# Patient Record
Sex: MALE | Race: WHITE | NOT HISPANIC OR LATINO | Employment: OTHER | URBAN - METROPOLITAN AREA
[De-identification: names, ages, dates, MRNs, and addresses within clinical notes are randomized per-mention and may not be internally consistent; named-entity substitution may affect disease eponyms.]

---

## 2018-02-27 ENCOUNTER — OFFICE VISIT (OUTPATIENT)
Dept: OPHTHALMOLOGY | Facility: CLINIC | Age: 63
End: 2018-02-27
Payer: COMMERCIAL

## 2018-02-27 DIAGNOSIS — H11.153 PINGUECULA OF BOTH EYES: Primary | ICD-10-CM

## 2018-02-27 DIAGNOSIS — H02.831 DERMATOCHALASIS OF BOTH UPPER EYELIDS: ICD-10-CM

## 2018-02-27 DIAGNOSIS — H02.834 DERMATOCHALASIS OF BOTH UPPER EYELIDS: ICD-10-CM

## 2018-02-27 DIAGNOSIS — H52.7 REFRACTIVE ERROR: ICD-10-CM

## 2018-02-27 PROCEDURE — 99999 PR PBB SHADOW E&M-EST. PATIENT-LVL II: CPT | Mod: PBBFAC,,, | Performed by: OPHTHALMOLOGY

## 2018-02-27 PROCEDURE — 92004 COMPRE OPH EXAM NEW PT 1/>: CPT | Mod: S$GLB,,, | Performed by: OPHTHALMOLOGY

## 2018-02-27 RX ORDER — CLORAZEPATE DIPOTASSIUM 3.75 MG/1
TABLET ORAL
COMMUNITY
Start: 2014-11-26 | End: 2021-09-10 | Stop reason: SDUPTHER

## 2018-02-27 RX ORDER — SILDENAFIL 100 MG/1
100 TABLET, FILM COATED ORAL DAILY PRN
COMMUNITY

## 2018-02-27 RX ORDER — LOSARTAN POTASSIUM 50 MG/1
50 TABLET ORAL
COMMUNITY
Start: 2014-04-14 | End: 2021-09-10 | Stop reason: SDUPTHER

## 2018-02-27 RX ORDER — AMLODIPINE BESYLATE 10 MG/1
10 TABLET ORAL
COMMUNITY
Start: 2014-04-14 | End: 2021-09-10 | Stop reason: SDUPTHER

## 2018-02-27 RX ORDER — DOCUSATE CALCIUM 240 MG
200 CAPSULE ORAL
COMMUNITY

## 2018-02-27 RX ORDER — PANTOPRAZOLE SODIUM 40 MG/1
40 TABLET, DELAYED RELEASE ORAL DAILY
COMMUNITY
End: 2021-09-10 | Stop reason: SDUPTHER

## 2018-02-27 RX ORDER — NAPROXEN 500 MG/1
500 TABLET ORAL
COMMUNITY
Start: 2014-09-26 | End: 2021-09-10 | Stop reason: SDUPTHER

## 2018-02-27 NOTE — PROGRESS NOTES
SUBJECTIVE:   Tom Shaw is a 62 y.o. male   Uncorrected distance visual acuity was 20/50 in the right eye and 20/40 -2 in the left eye.   Chief Complaint   Patient presents with    Blurred Vision     pt states time for updated exam havent had one in years no serious complaints        HPI:  HPI     Blurred Vision    Additional comments: pt states time for updated exam havent had one in   years no serious complaints       Last edited by Tg Sterling MA on 2/27/2018  8:58 AM. (History)        Assessment /Plan :  1. Pinguecula of both eyes monitor for now   2. Dermatochalasis of both upper eyelids monitor for now   3. Refractive error recommend +1.00 OTC glasses for distance and +2.75 OTC glasses for near     RTC in 1 year or prn any changes

## 2019-08-27 ENCOUNTER — OFFICE VISIT (OUTPATIENT)
Dept: OPHTHALMOLOGY | Facility: CLINIC | Age: 64
End: 2019-08-27
Payer: COMMERCIAL

## 2019-08-27 DIAGNOSIS — H52.7 REFRACTIVE ERROR: ICD-10-CM

## 2019-08-27 DIAGNOSIS — H10.13 ALLERGIC CONJUNCTIVITIS OF BOTH EYES: Primary | ICD-10-CM

## 2019-08-27 PROCEDURE — 92014 PR EYE EXAM, EST PATIENT,COMPREHESV: ICD-10-PCS | Mod: S$GLB,,, | Performed by: OPHTHALMOLOGY

## 2019-08-27 PROCEDURE — 92015 PR REFRACTION: ICD-10-PCS | Mod: S$GLB,,, | Performed by: OPHTHALMOLOGY

## 2019-08-27 PROCEDURE — 99999 PR PBB SHADOW E&M-EST. PATIENT-LVL II: CPT | Mod: PBBFAC,,, | Performed by: OPHTHALMOLOGY

## 2019-08-27 PROCEDURE — 92015 DETERMINE REFRACTIVE STATE: CPT | Mod: S$GLB,,, | Performed by: OPHTHALMOLOGY

## 2019-08-27 PROCEDURE — 99212 OFFICE O/P EST SF 10 MIN: CPT | Mod: PBBFAC | Performed by: OPHTHALMOLOGY

## 2019-08-27 PROCEDURE — 92014 COMPRE OPH EXAM EST PT 1/>: CPT | Mod: S$GLB,,, | Performed by: OPHTHALMOLOGY

## 2019-08-27 PROCEDURE — 99999 PR PBB SHADOW E&M-EST. PATIENT-LVL II: ICD-10-PCS | Mod: PBBFAC,,, | Performed by: OPHTHALMOLOGY

## 2019-08-27 RX ORDER — GABAPENTIN 300 MG/1
CAPSULE ORAL
COMMUNITY
Start: 2019-06-25 | End: 2021-09-10

## 2019-08-27 RX ORDER — CYCLOBENZAPRINE HCL 10 MG
10 TABLET ORAL 3 TIMES DAILY PRN
COMMUNITY
End: 2021-09-10 | Stop reason: SDUPTHER

## 2019-08-27 NOTE — PROGRESS NOTES
SUBJECTIVE:   Tom Shaw is a 64 y.o. male   Corrected distance visual acuity was 20/25 in the right eye and 20/30 -2 in the left eye.   Chief Complaint   Patient presents with    Blurred Vision        HPI:  HPI     Pt states va is very blurred at a distance also having trouble with blood   shot red eyes when he wakes in the morning     Last edited by Tg Sterling MA on 8/27/2019  8:02 AM. (History)        Assessment /Plan :  1. Allergic conjunctivitis of both eyes recommend Lumify as directed   2. Refractive error PAL Rx     RTC in 1 year or prn any changes

## 2019-09-24 ENCOUNTER — OFFICE VISIT (OUTPATIENT)
Dept: OPHTHALMOLOGY | Facility: CLINIC | Age: 64
End: 2019-09-24
Payer: MEDICARE

## 2019-09-24 DIAGNOSIS — H52.7 REFRACTIVE ERROR: Primary | ICD-10-CM

## 2019-09-24 PROCEDURE — 99999 PR PBB SHADOW E&M-EST. PATIENT-LVL II: CPT | Mod: PBBFAC,,, | Performed by: OPHTHALMOLOGY

## 2019-09-24 PROCEDURE — 99499 NO LOS: ICD-10-PCS | Mod: S$PBB,,, | Performed by: OPHTHALMOLOGY

## 2019-09-24 PROCEDURE — 99999 PR PBB SHADOW E&M-EST. PATIENT-LVL II: ICD-10-PCS | Mod: PBBFAC,,, | Performed by: OPHTHALMOLOGY

## 2019-09-24 PROCEDURE — 99212 OFFICE O/P EST SF 10 MIN: CPT | Mod: PBBFAC | Performed by: OPHTHALMOLOGY

## 2019-09-24 PROCEDURE — 99499 UNLISTED E&M SERVICE: CPT | Mod: S$PBB,,, | Performed by: OPHTHALMOLOGY

## 2019-09-24 NOTE — PROGRESS NOTES
SUBJECTIVE:   Tom Shaw is a 64 y.o. male   Corrected distance visual acuity was 20/25 +2 in the right eye and 20/20 in the left eye.   Chief Complaint   Patient presents with    Glasses prescription     re-chk        HPI:  HPI     Glasses prescription      Additional comments: re-chk              Comments     Pt here due to error in glasses. Pt believes the reading isn't strong   enough and the distance might be too strong.    PCP:Dr.Murry Loja    1.HTN          Last edited by Michael Hernandez, Patient Care Assistant on 9/24/2019  7:41   AM. (History)        Assessment /Plan :  1. Refractive error   Rx made incorrectly new PAL rx given

## 2021-07-17 ENCOUNTER — TELEPHONE (OUTPATIENT)
Dept: INTERNAL MEDICINE | Facility: CLINIC | Age: 66
End: 2021-07-17

## 2021-09-09 ENCOUNTER — TELEPHONE (OUTPATIENT)
Dept: INTERNAL MEDICINE | Facility: CLINIC | Age: 66
End: 2021-09-09

## 2021-09-10 ENCOUNTER — LAB VISIT (OUTPATIENT)
Dept: LAB | Facility: HOSPITAL | Age: 66
End: 2021-09-10
Attending: INTERNAL MEDICINE
Payer: MEDICARE

## 2021-09-10 ENCOUNTER — OFFICE VISIT (OUTPATIENT)
Dept: PRIMARY CARE CLINIC | Facility: CLINIC | Age: 66
End: 2021-09-10
Attending: INTERNAL MEDICINE
Payer: MEDICARE

## 2021-09-10 ENCOUNTER — TELEPHONE (OUTPATIENT)
Dept: INTERNAL MEDICINE | Facility: CLINIC | Age: 66
End: 2021-09-10

## 2021-09-10 VITALS
BODY MASS INDEX: 28.87 KG/M2 | SYSTOLIC BLOOD PRESSURE: 142 MMHG | HEIGHT: 68 IN | HEART RATE: 88 BPM | OXYGEN SATURATION: 97 % | DIASTOLIC BLOOD PRESSURE: 88 MMHG | WEIGHT: 190.5 LBS

## 2021-09-10 DIAGNOSIS — F41.9 ANXIETY: ICD-10-CM

## 2021-09-10 DIAGNOSIS — J30.89 NON-SEASONAL ALLERGIC RHINITIS, UNSPECIFIED TRIGGER: ICD-10-CM

## 2021-09-10 DIAGNOSIS — E66.3 OVERWEIGHT (BMI 25.0-29.9): ICD-10-CM

## 2021-09-10 DIAGNOSIS — I10 HYPERTENSION, UNSPECIFIED TYPE: ICD-10-CM

## 2021-09-10 DIAGNOSIS — M16.0 PRIMARY OSTEOARTHRITIS OF BOTH HIPS: ICD-10-CM

## 2021-09-10 DIAGNOSIS — Z87.11 HISTORY OF PEPTIC ULCER DISEASE: ICD-10-CM

## 2021-09-10 DIAGNOSIS — N52.9 ERECTILE DYSFUNCTION, UNSPECIFIED ERECTILE DYSFUNCTION TYPE: ICD-10-CM

## 2021-09-10 DIAGNOSIS — F51.04 CHRONIC INSOMNIA: ICD-10-CM

## 2021-09-10 DIAGNOSIS — G25.2 INTENTION TREMOR: ICD-10-CM

## 2021-09-10 PROBLEM — J30.9 ALLERGIC RHINITIS: Status: ACTIVE | Noted: 2021-09-10

## 2021-09-10 PROBLEM — K21.9 GERD (GASTROESOPHAGEAL REFLUX DISEASE): Status: ACTIVE | Noted: 2021-09-10

## 2021-09-10 PROBLEM — M19.90 OSTEOARTHRITIS: Status: ACTIVE | Noted: 2021-09-10

## 2021-09-10 LAB
ANION GAP SERPL CALC-SCNC: 10 MMOL/L (ref 8–16)
BUN SERPL-MCNC: 18 MG/DL (ref 8–23)
CALCIUM SERPL-MCNC: 9.6 MG/DL (ref 8.7–10.5)
CHLORIDE SERPL-SCNC: 107 MMOL/L (ref 95–110)
CHOLEST SERPL-MCNC: 148 MG/DL (ref 120–199)
CHOLEST/HDLC SERPL: 3.4 {RATIO} (ref 2–5)
CO2 SERPL-SCNC: 26 MMOL/L (ref 23–29)
CREAT SERPL-MCNC: 0.9 MG/DL (ref 0.5–1.4)
EST. GFR  (AFRICAN AMERICAN): >60 ML/MIN/1.73 M^2
EST. GFR  (NON AFRICAN AMERICAN): >60 ML/MIN/1.73 M^2
GLUCOSE SERPL-MCNC: 117 MG/DL (ref 70–110)
HDLC SERPL-MCNC: 43 MG/DL (ref 40–75)
HDLC SERPL: 29.1 % (ref 20–50)
LDLC SERPL CALC-MCNC: 90.8 MG/DL (ref 63–159)
NONHDLC SERPL-MCNC: 105 MG/DL
POTASSIUM SERPL-SCNC: 4.3 MMOL/L (ref 3.5–5.1)
SODIUM SERPL-SCNC: 143 MMOL/L (ref 136–145)
TRIGL SERPL-MCNC: 71 MG/DL (ref 30–150)

## 2021-09-10 PROCEDURE — 99204 PR OFFICE/OUTPT VISIT, NEW, LEVL IV, 45-59 MIN: ICD-10-PCS | Mod: S$PBB,,, | Performed by: INTERNAL MEDICINE

## 2021-09-10 PROCEDURE — 99213 OFFICE O/P EST LOW 20 MIN: CPT | Mod: PBBFAC | Performed by: INTERNAL MEDICINE

## 2021-09-10 PROCEDURE — 99204 OFFICE O/P NEW MOD 45 MIN: CPT | Mod: S$PBB,,, | Performed by: INTERNAL MEDICINE

## 2021-09-10 PROCEDURE — 80048 BASIC METABOLIC PNL TOTAL CA: CPT | Performed by: INTERNAL MEDICINE

## 2021-09-10 PROCEDURE — 99999 PR PBB SHADOW E&M-EST. PATIENT-LVL III: CPT | Mod: PBBFAC,,, | Performed by: INTERNAL MEDICINE

## 2021-09-10 PROCEDURE — 80061 LIPID PANEL: CPT | Performed by: INTERNAL MEDICINE

## 2021-09-10 PROCEDURE — 99999 PR PBB SHADOW E&M-EST. PATIENT-LVL III: ICD-10-PCS | Mod: PBBFAC,,, | Performed by: INTERNAL MEDICINE

## 2021-09-10 PROCEDURE — 36415 COLL VENOUS BLD VENIPUNCTURE: CPT | Performed by: INTERNAL MEDICINE

## 2021-09-10 RX ORDER — PANTOPRAZOLE SODIUM 40 MG/1
40 TABLET, DELAYED RELEASE ORAL DAILY
Qty: 30 TABLET | Refills: 11 | Status: SHIPPED | OUTPATIENT
Start: 2021-09-10 | End: 2022-07-10

## 2021-09-10 RX ORDER — AMLODIPINE BESYLATE 10 MG/1
10 TABLET ORAL DAILY
Qty: 30 TABLET | Refills: 11 | Status: SHIPPED | OUTPATIENT
Start: 2021-09-10 | End: 2022-09-13 | Stop reason: SDUPTHER

## 2021-09-10 RX ORDER — CLORAZEPATE DIPOTASSIUM 3.75 MG/1
7.5 TABLET ORAL NIGHTLY PRN
Qty: 60 TABLET | Refills: 1 | Status: SHIPPED | OUTPATIENT
Start: 2021-09-10 | End: 2021-12-23 | Stop reason: SDUPTHER

## 2021-09-10 RX ORDER — LOSARTAN POTASSIUM 50 MG/1
50 TABLET ORAL DAILY
Qty: 30 TABLET | Refills: 11 | Status: SHIPPED | OUTPATIENT
Start: 2021-09-10 | End: 2022-02-25

## 2021-09-10 RX ORDER — NAPROXEN 500 MG/1
500 TABLET ORAL 2 TIMES DAILY WITH MEALS
Qty: 60 TABLET | Refills: 5 | Status: SHIPPED | OUTPATIENT
Start: 2021-09-10 | End: 2022-07-14

## 2021-09-10 RX ORDER — PROPRANOLOL HYDROCHLORIDE 10 MG/1
10 TABLET ORAL 3 TIMES DAILY
Qty: 90 TABLET | Refills: 1 | Status: SHIPPED | OUTPATIENT
Start: 2021-09-10 | End: 2021-10-04

## 2021-09-10 RX ORDER — CYCLOBENZAPRINE HCL 10 MG
10 TABLET ORAL NIGHTLY
Qty: 30 TABLET | Refills: 5 | Status: SHIPPED | OUTPATIENT
Start: 2021-09-10 | End: 2022-08-08

## 2022-02-24 ENCOUNTER — TELEPHONE (OUTPATIENT)
Dept: INTERNAL MEDICINE | Facility: CLINIC | Age: 67
End: 2022-02-24
Payer: COMMERCIAL

## 2022-02-25 ENCOUNTER — LAB VISIT (OUTPATIENT)
Dept: LAB | Facility: HOSPITAL | Age: 67
End: 2022-02-25
Attending: INTERNAL MEDICINE
Payer: MEDICARE

## 2022-02-25 ENCOUNTER — OFFICE VISIT (OUTPATIENT)
Dept: PRIMARY CARE CLINIC | Facility: CLINIC | Age: 67
End: 2022-02-25
Attending: INTERNAL MEDICINE
Payer: MEDICARE

## 2022-02-25 VITALS
HEIGHT: 68 IN | DIASTOLIC BLOOD PRESSURE: 94 MMHG | HEART RATE: 74 BPM | BODY MASS INDEX: 26.66 KG/M2 | OXYGEN SATURATION: 98 % | SYSTOLIC BLOOD PRESSURE: 178 MMHG | WEIGHT: 175.94 LBS

## 2022-02-25 DIAGNOSIS — E66.3 OVERWEIGHT (BMI 25.0-29.9): ICD-10-CM

## 2022-02-25 DIAGNOSIS — I10 HYPERTENSION, UNSPECIFIED TYPE: ICD-10-CM

## 2022-02-25 DIAGNOSIS — N52.9 ERECTILE DYSFUNCTION, UNSPECIFIED ERECTILE DYSFUNCTION TYPE: ICD-10-CM

## 2022-02-25 DIAGNOSIS — Z87.11 HISTORY OF PEPTIC ULCER DISEASE: ICD-10-CM

## 2022-02-25 DIAGNOSIS — F51.04 CHRONIC INSOMNIA: ICD-10-CM

## 2022-02-25 DIAGNOSIS — I10 HYPERTENSION, UNSPECIFIED TYPE: Primary | ICD-10-CM

## 2022-02-25 DIAGNOSIS — M16.0 PRIMARY OSTEOARTHRITIS OF BOTH HIPS: ICD-10-CM

## 2022-02-25 DIAGNOSIS — G25.2 INTENTION TREMOR: ICD-10-CM

## 2022-02-25 DIAGNOSIS — Z91.89 AT INCREASED RISK FOR CARDIOVASCULAR DISEASE: ICD-10-CM

## 2022-02-25 DIAGNOSIS — F41.9 ANXIETY: ICD-10-CM

## 2022-02-25 LAB
ANION GAP SERPL CALC-SCNC: 9 MMOL/L (ref 8–16)
BUN SERPL-MCNC: 13 MG/DL (ref 8–23)
CALCIUM SERPL-MCNC: 9.7 MG/DL (ref 8.7–10.5)
CHLORIDE SERPL-SCNC: 105 MMOL/L (ref 95–110)
CO2 SERPL-SCNC: 29 MMOL/L (ref 23–29)
CREAT SERPL-MCNC: 0.8 MG/DL (ref 0.5–1.4)
EST. GFR  (AFRICAN AMERICAN): >60 ML/MIN/1.73 M^2
EST. GFR  (NON AFRICAN AMERICAN): >60 ML/MIN/1.73 M^2
GLUCOSE SERPL-MCNC: 107 MG/DL (ref 70–110)
POTASSIUM SERPL-SCNC: 4.6 MMOL/L (ref 3.5–5.1)
SODIUM SERPL-SCNC: 143 MMOL/L (ref 136–145)

## 2022-02-25 PROCEDURE — 99214 OFFICE O/P EST MOD 30 MIN: CPT | Mod: S$PBB,,, | Performed by: INTERNAL MEDICINE

## 2022-02-25 PROCEDURE — 36415 COLL VENOUS BLD VENIPUNCTURE: CPT | Performed by: INTERNAL MEDICINE

## 2022-02-25 PROCEDURE — 99213 OFFICE O/P EST LOW 20 MIN: CPT | Mod: PBBFAC | Performed by: INTERNAL MEDICINE

## 2022-02-25 PROCEDURE — 99214 PR OFFICE/OUTPT VISIT, EST, LEVL IV, 30-39 MIN: ICD-10-PCS | Mod: S$PBB,,, | Performed by: INTERNAL MEDICINE

## 2022-02-25 PROCEDURE — 99999 PR PBB SHADOW E&M-EST. PATIENT-LVL III: ICD-10-PCS | Mod: PBBFAC,,, | Performed by: INTERNAL MEDICINE

## 2022-02-25 PROCEDURE — 99999 PR PBB SHADOW E&M-EST. PATIENT-LVL III: CPT | Mod: PBBFAC,,, | Performed by: INTERNAL MEDICINE

## 2022-02-25 PROCEDURE — 80048 BASIC METABOLIC PNL TOTAL CA: CPT | Performed by: INTERNAL MEDICINE

## 2022-02-25 RX ORDER — CLORAZEPATE DIPOTASSIUM 7.5 MG/1
7.5 TABLET ORAL DAILY
Qty: 30 TABLET | Refills: 5 | Status: SHIPPED | OUTPATIENT
Start: 2022-02-25 | End: 2022-09-06 | Stop reason: SDUPTHER

## 2022-02-25 RX ORDER — PROPRANOLOL HYDROCHLORIDE 60 MG/1
60 CAPSULE, EXTENDED RELEASE ORAL DAILY
Qty: 30 CAPSULE | Refills: 11 | Status: SHIPPED | OUTPATIENT
Start: 2022-02-25 | End: 2022-12-13 | Stop reason: SDUPTHER

## 2022-02-25 RX ORDER — OLMESARTAN MEDOXOMIL 40 MG/1
40 TABLET ORAL DAILY
Qty: 90 TABLET | Refills: 3 | Status: SHIPPED | OUTPATIENT
Start: 2022-02-25 | End: 2022-09-13 | Stop reason: SDUPTHER

## 2022-02-25 RX ORDER — ATORVASTATIN CALCIUM 10 MG/1
10 TABLET, FILM COATED ORAL DAILY
Qty: 90 TABLET | Refills: 3 | Status: SHIPPED | OUTPATIENT
Start: 2022-02-25 | End: 2022-11-16

## 2022-02-25 NOTE — ASSESSMENT & PLAN NOTE
"His fasting blood sugar 6 months ago was mildly elevated  --he stopped soft drinks and reduced his intake of sweets, along with other dietary modifications (switched to diet sprite and lemon water and unsweetened tea)  --he has lost about 15 lbs  He is also walking more regularly  He "snacks" in the evening but has been choosing more sugar free options  "

## 2022-02-25 NOTE — ASSESSMENT & PLAN NOTE
Taking propanolol 10 mg twice daily -- but only between 20 and 50% improvement  Will changed to Inderal LA 60 mg to see if that works better for him

## 2022-02-25 NOTE — PROGRESS NOTES
"Intensive Primary Care Provider Appointment  Shared Note: Helen Barreto & Blaise NOLASCO)    Subjective:      Patient ID: Tom Shaw is a 66 y.o. male.    Chief Complaint: Follow-up      HPI:  Here for follow-up.  Tremor is a little better with the propanolol.      BP elevated today, which he attributes to "white coat" hypertension.  But he admits to not recently checking his BP's as often as he used to.      No specific complaints.  Does have some chronic shoulder pain for the past 8 to 10 years, for which he has been seen by Dr. Cristobal Fine.  Has received numerous shoulder injections.  Has been advised to undergo shoulder replacement.  Has also seen Dr. Oliver Denney, but has been advised to go as long as he can before doing that.  He controls the pain by taking naproxen daily -- which we discussed could be impacting his BP.  His last injection was about 3 years ago.  Pain not impeding him from sleeping just yet.       Health Maintenance  Health Maintenance       Date Due Completion Date    Hepatitis C Screening Never done ---    COVID-19 Vaccine (1) Never done ---    TETANUS VACCINE Never done ---    Colorectal Cancer Screening Never done ---    Pneumococcal Vaccines (Age 65+) (2 of 2 - PPSV23) 09/30/2022 9/30/2021    Lipid Panel 09/10/2026 9/10/2021          Atherosclerotic Cardiovascular Disease (ASCVD) Risk Score  The 10-year ASCVD risk score (Patricia MCFARLANE Jr., et al., 2013) is: 28%    Values used to calculate the score:      Age: 66 years      Sex: Male      Is Non- : No      Diabetic: No      Tobacco smoker: No      Systolic Blood Pressure: 178 mmHg      Is BP treated: Yes      HDL Cholesterol: 43 mg/dL      Total Cholesterol: 148 mg/dL    Past Medical History  Past Medical History:   Diagnosis Date    Hypertension     Osteoarthritis 9/10/2021       Past Surgical History  No past surgical history on file.    Social History  Social History     Socioeconomic History    Marital " "status:    Tobacco Use    Smoking status: Never Smoker    Smokeless tobacco: Never Used   Substance and Sexual Activity    Alcohol use: No    Drug use: No    Sexual activity: Yes       Family History  Family History   Problem Relation Age of Onset    Cataracts Mother         Review of Systems  Review of Systems   Constitutional: Positive for weight loss (intentional). Negative for malaise/fatigue.   Respiratory: Negative for shortness of breath.    Cardiovascular: Negative for chest pain.   Gastrointestinal: Negative for abdominal pain, blood in stool, melena, nausea and vomiting.   Genitourinary: Negative for hematuria.   Musculoskeletal: Positive for joint pain (right shoulder). Negative for falls.   Psychiatric/Behavioral: The patient is nervous/anxious and has insomnia.          Objective:     Vital Signs  BP (!) 178/94   Pulse 74   Ht 5' 8" (1.727 m)   Wt 79.8 kg (175 lb 14.8 oz)   SpO2 98%   BMI 26.75 kg/m²   Physical Exam  General:  Well-developed, well-nourished, no acute distress  Head:  Normocephalic, atraumatic  Eyes:  PERRL, conjunctiva pink without hemorrhages or petechiae, anicteric sclera, no episcleritis  Ears:  Tympanic membranes clear without erythema, ear canal without cerumen  Nose:  Normal turbinates, pink mucosa without purulent nasal discharge  Mouth:  Moist mucous membranes, no pharyngeal exudates or erythema  Neck:: Supple, normal ROM; No carotid bruit with normal carotid upstroke; No JVD  Chest:  clear to auscultation bilaterally, no respiratory distress  Heart:  Regular rate and rhythm without murmur, rub, or gallop  Abdomen:  Soft, nontender, no distension, normoactive bowel sounds, no abdominal bruit  Extremities:  No edema      Lab Results   Component Value Date    WBC 7.18 01/10/2007    HGB 15.8 01/10/2007    HCT 46.4 01/10/2007     01/10/2007    CHOL 148 09/10/2021    TRIG 71 09/10/2021    HDL 43 09/10/2021    ALT 27 01/10/2007    AST 14 01/10/2007     " 09/10/2021    K 4.3 09/10/2021     09/10/2021    CREATININE 0.9 09/10/2021    BUN 18 09/10/2021    CO2 26 09/10/2021    TSH 1.2 01/10/2007         Chemistry        Component Value Date/Time     09/10/2021 1216    K 4.3 09/10/2021 1216     09/10/2021 1216    CO2 26 09/10/2021 1216    BUN 18 09/10/2021 1216    CREATININE 0.9 09/10/2021 1216     (H) 09/10/2021 1216        Component Value Date/Time    CALCIUM 9.6 09/10/2021 1216    ALKPHOS 90 01/10/2007 0847    AST 14 01/10/2007 0847    ALT 27 01/10/2007 0847    BILITOT 0.4 01/10/2007 0847    ESTGFRAFRICA >60.0 09/10/2021 1216    EGFRNONAA >60.0 09/10/2021 1216            Lab Results   Component Value Date    CHOL 148 09/10/2021     Lab Results   Component Value Date    LDLCALC 90.8 09/10/2021     Lab Results   Component Value Date    TRIG 71 09/10/2021     Lab Results   Component Value Date    HDL 43 09/10/2021       Assessment:   66 y.o. male here for primary care visit       Plan:   Intention tremor  Taking propanolol 10 mg twice daily -- but only between 20 and 50% improvement  Will changed to Inderal LA 60 mg to see if that works better for him    Hypertension  History of white coat hypertension  Did not take his BP medications this morning  BP elevated today  He hasn't been checking his BP at home as often  My own recheck of BP was 178/94  --will change from losartan to olmesartan (greater potency and longer duration of action)  --continue amlodipine 10 mg daily  --he does take daily naproxen which we discussed can elevate BP (he takes for shoulder pain)  --we discussed him trying Tylenol instead  Will have him come back in one month to recheck BP and compare his home machine with our own  --will have him go to www.validatebp.org to see if his home cuff is a validated instrument    Overweight (BMI 25.0-29.9)  His fasting blood sugar 6 months ago was mildly elevated  --he stopped soft drinks and reduced his intake of sweets, along with  "other dietary modifications (switched to diet sprite and lemon water and unsweetened tea)  --he has lost about 15 lbs  He is also walking more regularly  He "snacks" in the evening but has been choosing more sugar free options    Chronic insomnia  Over the years has become dependent on both tranxene and flexeril to help him fall asleep  --I don't see any compelling reason to stop either one of these at this time  --he does not have any pattern of dosage escalation or development of tolerance    History of peptic ulcer disease  Discovered endoscopically in 2017--reportedly biopsies negative for H. Pylori and malignancy  --on chronic NSAID for many years  --on PPI to help protect against recurrence  --will keep an eye on renal function due to long term PPI use though    Anxiety  Stable  Long term use of tranxene, which he is taking bid  --his current dose is 3.75 mg bid  --he is asking for me to increase the dose to 7.5 mg daily (he gets it through [facility]) so that he can break them in half--continuing same dosage--to take 1/2 tablet twice daily    Erectile dysfunction  S/P penile implantation and Viagra prn    At increased risk for cardiovascular disease  ASCVD risk score elevated at 28% based on today's BP reading  Even with normal BP his risk is near 20%  --despite normal cholesterol, he really should be on a statin  --I strongly advised him to start a low dose of a statin, but he is very hesitant to do so  I advised him to start atorvastatin 10 mg 1/2 tablet daily, but he declines for now   He agreed to discuss again in one month when he comes back for BP recheck     Health maintenance  --completed his shingles vaccine series  --last screening colonoscopy done by Dr. Loco in 2017 -- need to get report  --completed a series of 2 COVID vaccines (Moderna)    Follow up in about 4 weeks (around 3/25/2022). 40 min spent face to face with patient, with >50% of that time on care coordination and delivery.    Helen" AMINAH Barreto MD/[facility]  Internal Medicine  [facility]  [phone]

## 2022-02-25 NOTE — ASSESSMENT & PLAN NOTE
Discovered endoscopically in 2017--reportedly biopsies negative for H. Pylori and malignancy  --on chronic NSAID for many years  --on PPI to help protect against recurrence  --will keep an eye on renal function due to long term PPI use though

## 2022-02-25 NOTE — ASSESSMENT & PLAN NOTE
Stable  Long term use of tranxene, which he is taking bid  --his current dose is 3.75 mg bid  --he is asking for me to increase the dose to 7.5 mg daily (he gets it through [facility]) so that he can break them in half--continuing same dosage--to take 1/2 tablet twice daily

## 2022-02-25 NOTE — ASSESSMENT & PLAN NOTE
History of white coat hypertension  Did not take his BP medications this morning  BP elevated today  He hasn't been checking his BP at home as often  My own recheck of BP was 178/94  --will change from losartan to olmesartan (greater potency and longer duration of action)  --continue amlodipine 10 mg daily  --he does take daily naproxen which we discussed can elevate BP (he takes for shoulder pain)  --we discussed him trying Tylenol instead  Will have him come back in one month to recheck BP and compare his home machine with our own  --will have him go to www.validatebp.org to see if his home cuff is a validated instrument  --if we find a discrepancy then he may need ambulatory BP monitoring

## 2022-02-25 NOTE — ASSESSMENT & PLAN NOTE
ASCVD risk score elevated at 28% based on today's BP reading  Even with normal BP his risk is near 20%  --despite normal cholesterol, he really should be on a statin  --I strongly advised him to start a low dose of a statin, but he is very hesitant to do so  I advised him to start atorvastatin 10 mg 1/2 tablet daily, but he declines for now   He agreed to discuss again in one month when he comes back for BP recheck    Addendum:  Discussed lab results with patient by phone  Fasting blood sugar down to 107  -He tells me that he spoke with his wife and agrees to start low dose atorvastatin 5 mg daily (will prescribe 10 mg and tell him to break it in half and see how he tolerates that low dose)

## 2022-02-25 NOTE — PATIENT INSTRUCTIONS
Go to www.validatebp.org to see if your home blood pressure cuff is a validated instrument     And come back in a month with your BP cuff so that we can compare your home cuff to our in office reading    If there is a discrepancy between home cuff and our cuff, then you will need to undergo ambulatory home BP monitoring        Statin Safety, Tolerability, and Monitoring  Statins are generally safe and well tolerated    Statins can cause mild elevations in liver enzymes in ~1 out of every 100 people, but actual liver damage is very rare (occurring in only 1 out of every 100,000 people)  Before starting on a statin, we will check your liver enzymes, but there is no need to recheck them again in the absence of any symptoms  Statin-associated muscle symptoms (usually diffuse muscle aches) occur in ~1 out of every 100 people, but the incidence of muscle injury is less than 1 in 1000 - if you develop severe muscle aches while taking a statin, we will check a muscle enzyme known as CPK, but there is no need to stop the statin unless the CPK is elevated more than 5 times the upper limit of normal  Even if you experience severe muscle aches while on a statin, most people can tolerate a different statin, a lower dose, or even less frequent dosing (like taking it 3 days per week instead of daily)  Although statin therapy may increase the risk of newly diagnosed type 2 diabetes by ~0.2% (2 out of every 1000 people) per year, the risk appears restricted to people who already have other risk factors for diabetes  Fear of statins causing diabetes is a great example of how information without context can be dangerous. After all, diabetic patients don't die from high sugar. They die from cardiovascular disease.  And in high-risk diabetic patients, statin therapy reduced the risk of recurrent heart attack by 55%.   Patients on atorvastatin as part of optimal medical therapy had one fourth as many heart attacks, their heart attacks  were delayed by 8 years, and they lived 8 years longer!    When hesitating to take a statin, we need to weigh the considerable benefit of preventing heart attacks, reducing your risk of heart disease complications, and living a longer healthier and higher quality of life against the modest risk of increased incidence of diabetes  My own opinion is that this is an example of a case where benefit clearly outweighs the risk, but each person must make the decision that is best for them and their own health related goals   In addition to their benefits of lowering the LDL (bad) cholesterol in your blood, they have beneficial antioxidant and anti-inflammatory properties that further explain their heart and blood vessel protection benefits  We will check a lipid panel 4 to 12 weeks after starting a statin to assess your treatment response  Treatment goals are > 50% reduction in LDL with high-intensity statin therapy or a 30 to 49% reduction in LDL with moderate-intensity statin therapy  Once treatment goals are achieved, we will recheck lipid levels about once per year

## 2022-02-25 NOTE — ASSESSMENT & PLAN NOTE
Over the years has become dependent on both tranxene and flexeril to help him fall asleep  --I don't see any compelling reason to stop either one of these at this time  --he does not have any pattern of dosage escalation or development of tolerance

## 2022-03-15 ENCOUNTER — TELEPHONE (OUTPATIENT)
Dept: INTERNAL MEDICINE | Facility: CLINIC | Age: 67
End: 2022-03-15
Payer: COMMERCIAL

## 2022-03-15 NOTE — TELEPHONE ENCOUNTER
Spoke to pt he informed me of BP readings   3/10/2022-122/75  3/12/2022-113/71 and 130/80  3/13/2022-125/77  3/14/2022-131/79  3/15/2022-128/81  name of BP machine is Omron   Model #[id number]

## 2022-03-18 ENCOUNTER — TELEPHONE (OUTPATIENT)
Dept: INTERNAL MEDICINE | Facility: CLINIC | Age: 67
End: 2022-03-18
Payer: COMMERCIAL

## 2022-03-18 ENCOUNTER — RESEARCH ENCOUNTER (OUTPATIENT)
Dept: INTERNAL MEDICINE | Facility: CLINIC | Age: 67
End: 2022-03-18
Payer: COMMERCIAL

## 2022-03-18 VITALS — SYSTOLIC BLOOD PRESSURE: 122 MMHG | DIASTOLIC BLOOD PRESSURE: 75 MMHG

## 2022-03-18 NOTE — TELEPHONE ENCOUNTER
He purchased a new home BP cuff -- Model Omron 7350, which is a validated instrument according to www.validatebp.com     Multiple home readings are indicating adequate control.  I suspect there could have been a component of white coat hypertension at his last office visit.

## 2022-03-24 ENCOUNTER — TELEPHONE (OUTPATIENT)
Dept: INTERNAL MEDICINE | Facility: CLINIC | Age: 67
End: 2022-03-24
Payer: COMMERCIAL

## 2022-03-25 ENCOUNTER — OFFICE VISIT (OUTPATIENT)
Dept: PRIMARY CARE CLINIC | Facility: CLINIC | Age: 67
End: 2022-03-25
Attending: INTERNAL MEDICINE
Payer: MEDICARE

## 2022-03-25 VITALS
DIASTOLIC BLOOD PRESSURE: 80 MMHG | SYSTOLIC BLOOD PRESSURE: 138 MMHG | WEIGHT: 176.56 LBS | BODY MASS INDEX: 26.76 KG/M2 | HEART RATE: 63 BPM | HEIGHT: 68 IN | OXYGEN SATURATION: 99 %

## 2022-03-25 DIAGNOSIS — E78.5 HYPERLIPIDEMIA, UNSPECIFIED HYPERLIPIDEMIA TYPE: ICD-10-CM

## 2022-03-25 DIAGNOSIS — I10 HYPERTENSION, UNSPECIFIED TYPE: Primary | ICD-10-CM

## 2022-03-25 DIAGNOSIS — M16.0 PRIMARY OSTEOARTHRITIS OF BOTH HIPS: ICD-10-CM

## 2022-03-25 DIAGNOSIS — G25.2 INTENTION TREMOR: ICD-10-CM

## 2022-03-25 DIAGNOSIS — Z51.81 ENCOUNTER FOR THERAPEUTIC DRUG MONITORING: ICD-10-CM

## 2022-03-25 DIAGNOSIS — Z91.89 AT INCREASED RISK FOR CARDIOVASCULAR DISEASE: ICD-10-CM

## 2022-03-25 PROCEDURE — 99213 OFFICE O/P EST LOW 20 MIN: CPT | Mod: S$PBB,,, | Performed by: INTERNAL MEDICINE

## 2022-03-25 PROCEDURE — 99213 OFFICE O/P EST LOW 20 MIN: CPT | Mod: PBBFAC | Performed by: INTERNAL MEDICINE

## 2022-03-25 PROCEDURE — 99213 PR OFFICE/OUTPT VISIT, EST, LEVL III, 20-29 MIN: ICD-10-PCS | Mod: S$PBB,,, | Performed by: INTERNAL MEDICINE

## 2022-03-25 PROCEDURE — 99999 PR PBB SHADOW E&M-EST. PATIENT-LVL III: ICD-10-PCS | Mod: PBBFAC,,, | Performed by: INTERNAL MEDICINE

## 2022-03-25 PROCEDURE — 99999 PR PBB SHADOW E&M-EST. PATIENT-LVL III: CPT | Mod: PBBFAC,,, | Performed by: INTERNAL MEDICINE

## 2022-03-25 NOTE — ASSESSMENT & PLAN NOTE
We changed him to a sustained release preparation of Inderal LA -- he estimates about a 30-40% improvement in his tremor  --He notices perhaps a mild decrease in his energy, but seems to be tolerating  --his HR did go down to 47 on one occasion but he was asymptomatic  ---mostly his HR has been in the 50's

## 2022-03-25 NOTE — PROGRESS NOTES
Intensive Primary Care Provider Appointment  Shared Note: Helen Barreto & Blaise NOLASCO)    Subjective:      Patient ID: Tom Shaw is a 66 y.o. male.    Chief Complaint: Follow-up      HPI:  He purchased a home BP monitor Omron 7350 and BP have been good.    His list of recent BP:  122/75, 113/71, 130/80, 125/77, 131/79, 128/81, 123/69, 125/79, 119/74, 122/76    He stopped his nightly bedtime dose of naproxen, and started taking Tylenol instead.  However, pain in his right shoulder has worsened and become almost intolerable without the naproxen.  He has been told that he needs shoulder replacement.      He started taking lipitor and is tolerating it without difficulty.      No new problems or complaints.    Health Maintenance  Health Maintenance       Date Due Completion Date    Hepatitis C Screening Never done ---    COVID-19 Vaccine (1) Never done ---    TETANUS VACCINE Never done ---    Colorectal Cancer Screening Never done ---    Pneumococcal Vaccines (Age 65+) (2 of 2 - PPSV23) 09/30/2022 9/30/2021    Lipid Panel 09/10/2026 9/10/2021          Atherosclerotic Cardiovascular Disease (ASCVD) Risk Score  The 10-year ASCVD risk score (Patriciafang MCFARLANE Jr., et al., 2013) is: 18.8%    Values used to calculate the score:      Age: 66 years      Sex: Male      Is Non- : No      Diabetic: No      Tobacco smoker: No      Systolic Blood Pressure: 138 mmHg      Is BP treated: Yes      HDL Cholesterol: 43 mg/dL      Total Cholesterol: 148 mg/dL    Past Medical History  Past Medical History:   Diagnosis Date    Hypertension     Osteoarthritis 9/10/2021       Past Surgical History  No past surgical history on file.    Social History  Social History     Socioeconomic History    Marital status:    Tobacco Use    Smoking status: Never Smoker    Smokeless tobacco: Never Used   Substance and Sexual Activity    Alcohol use: No    Drug use: No    Sexual activity: Yes       Family  "History  Family History   Problem Relation Age of Onset    Cataracts Mother         Review of Systems  Review of Systems   Constitutional: Negative for malaise/fatigue and weight loss.   Respiratory: Negative for shortness of breath.    Cardiovascular: Negative for chest pain.   Gastrointestinal: Negative for heartburn.   Musculoskeletal: Positive for joint pain (right worse than left shoulder).         Objective:     Vital Signs  /80   Pulse 63   Ht 5' 8" (1.727 m)   Wt 80.1 kg (176 lb 9.4 oz)   SpO2 99%   BMI 26.85 kg/m²   Physical Exam  General:  Well-developed, well-nourished, no acute distress  Head:  Normocephalic, atraumatic  Neck:: No carotid bruit with normal carotid upstroke; No JVD  Chest:  clear to auscultation bilaterally, no respiratory distress  Heart:  Regular rate and rhythm without murmur, rub, or gallop  Extremities:  No clubbing, cyanosis, or edema      Lab Results   Component Value Date    WBC 7.18 01/10/2007    HGB 15.8 01/10/2007    HCT 46.4 01/10/2007     01/10/2007    CHOL 148 09/10/2021    TRIG 71 09/10/2021    HDL 43 09/10/2021    ALT 27 01/10/2007    AST 14 01/10/2007     02/25/2022    K 4.6 02/25/2022     02/25/2022    CREATININE 0.8 02/25/2022    BUN 13 02/25/2022    CO2 29 02/25/2022    TSH 1.2 01/10/2007         Chemistry        Component Value Date/Time     02/25/2022 1214    K 4.6 02/25/2022 1214     02/25/2022 1214    CO2 29 02/25/2022 1214    BUN 13 02/25/2022 1214    CREATININE 0.8 02/25/2022 1214     02/25/2022 1214        Component Value Date/Time    CALCIUM 9.7 02/25/2022 1214    ALKPHOS 90 01/10/2007 0847    AST 14 01/10/2007 0847    ALT 27 01/10/2007 0847    BILITOT 0.4 01/10/2007 0847    ESTGFRAFRICA >60.0 02/25/2022 1214    EGFRNONAA >60.0 02/25/2022 1214            Lab Results   Component Value Date    CHOL 148 09/10/2021     Lab Results   Component Value Date    LDLCALC 90.8 09/10/2021     Lab Results   Component Value " Date    TRIG 71 09/10/2021     Lab Results   Component Value Date    HDL 43 09/10/2021       Assessment:   66 y.o. male here for primary care visit       Plan:   At increased risk for cardiovascular disease  Started on statin because of elevated ASCVD risk score (28%)  --tolerating atorvastatin without difficulty  --recheck lipid panel to confirm at least 30% reduction in LDL    Osteoarthritis  S/P bilateral TKA  Currently his pain is greatest in right shoulder (and to a lesser extent in left shoulder) -- may eventually need shoulder replacement  --Tylenol not adequately controlling symptoms  --he may resume naproxen at bedtime -- BP's currently controlled, and he will continue to monitor with his new home monitor  --also on concomitant PPI (renal function has been very stable)    Intention tremor  We changed him to a sustained release preparation of Inderal LA -- he estimates about a 30-40% improvement in his tremor  --He notices perhaps a mild decrease in his energy, but seems to be tolerating  --his HR did go down to 47 on one occasion but he was asymptomatic  ---mostly his HR has been in the 50's       Follow up in about 6 months (around 9/25/2022). 30 min spent face to face with patient, with >50% of that time on care coordination and delivery.    AMINAH Huitron MD/[facility]  Internal Medicine  [facility]  [phone]

## 2022-03-25 NOTE — ASSESSMENT & PLAN NOTE
Started on statin because of elevated ASCVD risk score (28%)  --tolerating atorvastatin without difficulty  --recheck lipid panel to confirm at least 30% reduction in LDL

## 2022-03-25 NOTE — ASSESSMENT & PLAN NOTE
S/P bilateral TKA  Currently his pain is greatest in right shoulder (and to a lesser extent in left shoulder) -- may eventually need shoulder replacement  --Tylenol not adequately controlling symptoms  --he may resume naproxen at bedtime -- BP's currently controlled, and he will continue to monitor with his new home monitor  --also on concomitant PPI (renal function has been very stable)

## 2022-05-30 ENCOUNTER — TELEPHONE (OUTPATIENT)
Dept: INTERNAL MEDICINE | Facility: CLINIC | Age: 67
End: 2022-05-30
Payer: COMMERCIAL

## 2022-09-09 ENCOUNTER — TELEPHONE (OUTPATIENT)
Dept: PRIMARY CARE CLINIC | Facility: CLINIC | Age: 67
End: 2022-09-09

## 2022-09-09 NOTE — TELEPHONE ENCOUNTER
----- Message from Blaise Cotto MD sent at 9/9/2022 11:01 AM CDT -----  Regarding: FW: F/U  Mr. Shaw is due back for his 6-month F/U.  Please check and see if he has appt and if not get him scheduled.     ----- Message -----  From: Blaise Cotto MD  Sent: 9/9/2022  12:00 AM CDT  To: Blaise Cotto MD  Subject: F/U                                              Due back 9/2022

## 2022-09-22 ENCOUNTER — TELEPHONE (OUTPATIENT)
Dept: PRIMARY CARE CLINIC | Facility: CLINIC | Age: 67
End: 2022-09-22
Payer: COMMERCIAL

## 2022-09-23 ENCOUNTER — TELEPHONE (OUTPATIENT)
Dept: PRIMARY CARE CLINIC | Facility: CLINIC | Age: 67
End: 2022-09-23

## 2022-09-23 ENCOUNTER — LAB VISIT (OUTPATIENT)
Dept: LAB | Facility: HOSPITAL | Age: 67
End: 2022-09-23
Attending: INTERNAL MEDICINE
Payer: MEDICARE

## 2022-09-23 ENCOUNTER — OFFICE VISIT (OUTPATIENT)
Dept: PRIMARY CARE CLINIC | Facility: CLINIC | Age: 67
End: 2022-09-23
Attending: INTERNAL MEDICINE
Payer: MEDICARE

## 2022-09-23 VITALS
SYSTOLIC BLOOD PRESSURE: 128 MMHG | HEART RATE: 50 BPM | BODY MASS INDEX: 26.23 KG/M2 | WEIGHT: 173.06 LBS | HEIGHT: 68 IN | DIASTOLIC BLOOD PRESSURE: 81 MMHG | OXYGEN SATURATION: 98 %

## 2022-09-23 DIAGNOSIS — I10 HYPERTENSION, UNSPECIFIED TYPE: ICD-10-CM

## 2022-09-23 DIAGNOSIS — F41.9 ANXIETY: ICD-10-CM

## 2022-09-23 DIAGNOSIS — Z91.89 AT INCREASED RISK FOR CARDIOVASCULAR DISEASE: ICD-10-CM

## 2022-09-23 DIAGNOSIS — G25.2 INTENTION TREMOR: ICD-10-CM

## 2022-09-23 DIAGNOSIS — M16.0 PRIMARY OSTEOARTHRITIS OF BOTH HIPS: ICD-10-CM

## 2022-09-23 DIAGNOSIS — Z00.00 HEALTHCARE MAINTENANCE: ICD-10-CM

## 2022-09-23 DIAGNOSIS — E78.5 HYPERLIPIDEMIA, UNSPECIFIED HYPERLIPIDEMIA TYPE: ICD-10-CM

## 2022-09-23 DIAGNOSIS — F51.04 CHRONIC INSOMNIA: ICD-10-CM

## 2022-09-23 LAB
ALBUMIN SERPL BCP-MCNC: 4.6 G/DL (ref 3.5–5.2)
ALP SERPL-CCNC: 82 U/L (ref 55–135)
ALT SERPL W/O P-5'-P-CCNC: 15 U/L (ref 10–44)
ANION GAP SERPL CALC-SCNC: 10 MMOL/L (ref 8–16)
AST SERPL-CCNC: 20 U/L (ref 10–40)
BILIRUB SERPL-MCNC: 0.9 MG/DL (ref 0.1–1)
BUN SERPL-MCNC: 16 MG/DL (ref 8–23)
CALCIUM SERPL-MCNC: 9.8 MG/DL (ref 8.7–10.5)
CHLORIDE SERPL-SCNC: 106 MMOL/L (ref 95–110)
CHOLEST SERPL-MCNC: 113 MG/DL (ref 120–199)
CHOLEST/HDLC SERPL: 2.5 {RATIO} (ref 2–5)
CO2 SERPL-SCNC: 27 MMOL/L (ref 23–29)
CREAT SERPL-MCNC: 1 MG/DL (ref 0.5–1.4)
EST. GFR  (NO RACE VARIABLE): >60 ML/MIN/1.73 M^2
GLUCOSE SERPL-MCNC: 97 MG/DL (ref 70–110)
HDLC SERPL-MCNC: 45 MG/DL (ref 40–75)
HDLC SERPL: 39.8 % (ref 20–50)
LDLC SERPL CALC-MCNC: 57.6 MG/DL (ref 63–159)
NONHDLC SERPL-MCNC: 68 MG/DL
POTASSIUM SERPL-SCNC: 4.7 MMOL/L (ref 3.5–5.1)
PROT SERPL-MCNC: 7.2 G/DL (ref 6–8.4)
SODIUM SERPL-SCNC: 143 MMOL/L (ref 136–145)
TRIGL SERPL-MCNC: 52 MG/DL (ref 30–150)

## 2022-09-23 PROCEDURE — 36415 COLL VENOUS BLD VENIPUNCTURE: CPT | Performed by: INTERNAL MEDICINE

## 2022-09-23 PROCEDURE — 80053 COMPREHEN METABOLIC PANEL: CPT | Performed by: INTERNAL MEDICINE

## 2022-09-23 PROCEDURE — 99999 PR PBB SHADOW E&M-EST. PATIENT-LVL III: ICD-10-PCS | Mod: PBBFAC,,, | Performed by: INTERNAL MEDICINE

## 2022-09-23 PROCEDURE — 99999 PR PBB SHADOW E&M-EST. PATIENT-LVL III: CPT | Mod: PBBFAC,,, | Performed by: INTERNAL MEDICINE

## 2022-09-23 PROCEDURE — 80061 LIPID PANEL: CPT | Performed by: INTERNAL MEDICINE

## 2022-09-23 PROCEDURE — G0009 ADMIN PNEUMOCOCCAL VACCINE: HCPCS | Mod: PBBFAC

## 2022-09-23 PROCEDURE — 99213 OFFICE O/P EST LOW 20 MIN: CPT | Mod: PBBFAC,25 | Performed by: INTERNAL MEDICINE

## 2022-09-23 PROCEDURE — 99214 PR OFFICE/OUTPT VISIT, EST, LEVL IV, 30-39 MIN: ICD-10-PCS | Mod: S$PBB,,, | Performed by: INTERNAL MEDICINE

## 2022-09-23 PROCEDURE — 99214 OFFICE O/P EST MOD 30 MIN: CPT | Mod: S$PBB,,, | Performed by: INTERNAL MEDICINE

## 2022-09-23 RX ORDER — AMLODIPINE BESYLATE 10 MG/1
10 TABLET ORAL DAILY
Qty: 90 TABLET | Refills: 3 | Status: SHIPPED | OUTPATIENT
Start: 2022-09-23 | End: 2023-11-14 | Stop reason: SDUPTHER

## 2022-09-23 RX ORDER — CYCLOBENZAPRINE HCL 10 MG
10 TABLET ORAL NIGHTLY
Qty: 30 TABLET | Refills: 5 | Status: SHIPPED | OUTPATIENT
Start: 2022-09-23 | End: 2023-04-03 | Stop reason: SDUPTHER

## 2022-09-23 RX ORDER — CLORAZEPATE DIPOTASSIUM 7.5 MG/1
7.5 TABLET ORAL DAILY
Qty: 30 TABLET | Refills: 5 | Status: SHIPPED | OUTPATIENT
Start: 2022-09-23 | End: 2023-03-24

## 2022-09-23 NOTE — ASSESSMENT & PLAN NOTE
He reports about 60% improvement in his tremor since changing to inderal-LA  His handwriting has improved dramatically  Able to eat without spilling food  HR at 50, but asymptomatic

## 2022-09-23 NOTE — ASSESSMENT & PLAN NOTE
On statin -- tolerating without difficulty  Check lipid panel today and again in one year (9/2023)

## 2022-09-23 NOTE — ASSESSMENT & PLAN NOTE
Shoulder doing better  Back on NSAID chronically (with daily PPI) -- watching renal function because of risk of ROBERT from PPI

## 2022-09-23 NOTE — PROGRESS NOTES
After obtaining consent, and per orders of Dr. Cotto, injection of Pneumovax 23 given by GERDA ETIENNE. Patient instructed to remain in clinic for 15 minutes afterwards, and to report any adverse reaction to staff immediately pt tolerated well.

## 2022-09-23 NOTE — ASSESSMENT & PLAN NOTE
History of white coat hypertension  BP at home have been consistently in the normal range 120s/70s-80s  On max doses of amlodipine + olmesartan  Due for CMP today  Recheck CMP again in 6 months

## 2022-09-23 NOTE — PATIENT INSTRUCTIONS
Patient Education regarding insomnia (difficulty falling or staying asleep, or poor-quality sleep)     Make sure you are practicing good sleep hygiene!    Techniques for Good Sleep Hygiene   During the Day   You should make sure to get adequate exposure to natural light   Avoid napping!   Avoid the following close to bedtime   Avoid substances that may interrupt your sleep (caffeine [coffee and tea], alcohol, nicotine [cigarettes and tobacco], decongestants [found in many over-the-counter cold and allergy medications]   No vigorous exercise   No large meals   No emotionally upsetting activities or conversations   At Bedtime   Establish a regular relaxing bedtime routine (30 minutes)   Associate the bed and bedroom with sleep   Keep the bedroom quiet and dark (consider an eye mask if necessary)   Keep a stable bedtime and arising time (try and go to bed and awake at the same regular times)   Spend no more than 20 minutes awake in bed (get up and do something boring or relaxing if need be)   Spend no more than 8 hours in bed   Avoid screen time (no television, computer/tablet, or phone)       Medications for insomnia should be used as a last resort only!  Medications used in the treatment of insomnia are associated with harms, including daytime drowsiness (like falling asleep while driving), increased risk for falls and hip fracture, and medication-related hallucinations.  Ideally, medications--when needed--should be taken in short-term trials (no more than 4-5 weeks) and should be targeted at the most problematic phase of sleep (falling asleep versus staying asleep).  If you are prescribed a medication, make sure to ask your doctor which phase of sleep the medication targets and verify that it is the phase that is causing you trouble.  Never use benzodiazepines--like Valium or diazepam, Klonopin or clonazepam, Ativan or lorazepam, or Xanax or alprazolam--for sleep difficulties, and never combine medications for sleep  with alcohol or other sedatives.  Long-term therapy with medications should be avoided.      If you are 65 years of age or older, you should avoid over-the-counter sleep medications like antihistamines such as Benadryl or diphenhydramine!  These medications have anticholinergic side effects that can cause carry-over daytime sleepiness.  In addition, there is a growing body of evidence linking long-term anticholinergic use to an increased risk of dementia.  Antihistamines should be avoided entirely in older adults (> age 65), as the risk for side effects is magnified.      The best treatment for insomnia is a combination of the sleep hygiene techniques listed above, plus sleep restriction (not sleeping more than 8 hours per day), and cognitive behavioral therapy for insomnia (CBT-I) which is done by a licensed clinical  or therapist.    When a medication is needed--if your symptoms have not been helped by CBT-I and sleep hygiene techniques, or if you do not have access to this type of treatment--then a short course of sleep-inducing medication may be indicated (like Ambien or zolpidem).  When taking medications for sleep, you should avoid driving for at least 8 hours after taking the medication.  If medications are needed for longer than 4 to 5 weeks, then Doxepin is the recommended drug of choice.  Doxepin is available generically and inexpensively as 10-mg capsules and doses should never exceed that amount.  There is a branded formulation of Doxepin (Silenor) that is available in lower dosage tablets (3- and 6-mg), but it is expensive and may not be covered by your insurance formulary.  Another drug commonly prescribed to help people sleep is trazodone because it is relatively short acting and therefore not typically associated with daytime drowsiness like the longer acting sleep medications, however it is not FDA approved for this purpose.

## 2022-09-23 NOTE — PROGRESS NOTES
Intensive Primary Care Provider Appointment  Shared Note: Helen Barreto & Blaise NOLASCO)    Subjective:      Patient ID: Tom Shaw is a 67 y.o. male.    Chief Complaint: Follow-up      HPI:    Follow-up  Associated symptoms include neck pain. Pertinent negatives include no abdominal pain, chest pain or myalgias.   Here for follow-up.  BP at home have been in the 120s/70s-80s range.    His last colonoscopy was in 2017 -- Dr. Loco in [place] -- no polyps.  Has been feeling well.  No problems or complaints.    Remains very active, remodeling his granddaughter's home, mowing his own grass.  Independent with ADLs and iADLs.    No falls.  He does have some mild leaking of urine since receiving a urinary sphincter replacement.  While hanging around the house, he wears only underwear, but if he leaves the house for awhile he wears a light pad.      No memory issues or forgetfulness.      His intention tremor is much better since starting the inderal.      He takes naprosyn bid chronically since he was in his 30s, and is on a daily PPI for gastroprotection.  He had tried changing to Tylenol before our last visit, but it didn't help and so he went back to NSAID.       Atherosclerotic Cardiovascular Disease (ASCVD) Risk Score  The 10-year ASCVD risk score (Jia DK, et al., 2019) is: 17.9%    Values used to calculate the score:      Age: 67 years      Sex: Male      Is Non- : No      Diabetic: No      Tobacco smoker: No      Systolic Blood Pressure: 128 mmHg      Is BP treated: Yes      HDL Cholesterol: 43 mg/dL      Total Cholesterol: 148 mg/dL    Past Medical History  Past Medical History:   Diagnosis Date    Hypertension     Osteoarthritis 9/10/2021       Past Surgical History  History reviewed. No pertinent surgical history.    Social History  Social History     Socioeconomic History    Marital status:    Tobacco Use    Smoking status: Never    Smokeless tobacco: Never  "  Substance and Sexual Activity    Alcohol use: No    Drug use: No    Sexual activity: Yes       Family History  Family History   Problem Relation Age of Onset    Cataracts Mother         Review of Systems  Review of Systems   Constitutional:  Negative for malaise/fatigue and weight loss.   Eyes:         Wears glasses, due for annual eye exam   Respiratory:  Negative for shortness of breath.    Cardiovascular:  Negative for chest pain and leg swelling.   Gastrointestinal:  Negative for abdominal pain, blood in stool and heartburn (on daily PPI).   Genitourinary:  Negative for frequency and hematuria.   Musculoskeletal:  Positive for back pain, joint pain and neck pain. Negative for myalgias.   Neurological:  Negative for tingling and focal weakness.   Psychiatric/Behavioral:  Negative for memory loss. The patient has insomnia (chronic, has been on tranxene for many years).        Objective:       Vital Signs  /81   Pulse (!) 50   Ht 5' 8" (1.727 m)   Wt 78.5 kg (173 lb 1 oz)   SpO2 98%   BMI 26.31 kg/m²   Physical Exam  General:  Well-developed, well-nourished, no acute distress  Head:  Normocephalic, atraumatic  Eyes:  PERRL, conjunctiva pink without hemorrhages or petechiae, anicteric sclera, no episcleritis  Nose:  Normal turbinates, pink mucosa without purulent nasal discharge  Mouth:  Moist mucous membranes, no pharyngeal exudates or erythema  Neck:: Supple, normal ROM; No carotid bruit with normal carotid upstroke; No JVD  Chest:  clear to auscultation bilaterally, no respiratory distress  Heart:  Regular rate and rhythm without murmur, rub, or gallop  Abdomen:  Soft, nontender, no distension, normoactive bowel sounds, no abdominal bruit  Extremities:  No clubbing, cyanosis, or edema  Neuro:  Alert and oriented, no focal deficits  Physical Exam      Assessment:   67 y.o. male here for primary care visit       Plan:   Anxiety  Stable  Long term use of tranxene, which he is taking bid  --his current " dose is 3.75 mg bid  --he asked for me to increase the dose to 7.5 mg daily (he gets it through [facility]) so that he can break them in half--continuing same dosage--to take 1/2 tablet twice daily    Intention tremor  He reports about 60% improvement in his tremor since changing to inderal-LA  His handwriting has improved dramatically  Able to eat without spilling food  HR at 50, but asymptomatic    Hypertension  History of white coat hypertension  BP at home have been consistently in the normal range 120s/70s-80s  On max doses of amlodipine + olmesartan  Due for CMP today  Recheck CMP again in 6 months    At increased risk for cardiovascular disease  On atorvastatin  Recheck lipid panel today    Chronic insomnia  Over the years has become dependent on both tranxene and flexeril to help him fall asleep  --I don't see any compelling reason to stop either one of these at this time  --he does not have any pattern of dosage escalation or development of tolerance    Hyperlipidemia  On statin -- tolerating without difficulty  Check lipid panel today and again in one year (9/2023)    Osteoarthritis  Shoulder doing better  Back on NSAID chronically (with daily PPI) -- watching renal function because of risk of ROBERT from PPI    Healthcare maintenance  Had PCV 13 one year ago  Due for PPSV 23 today       Follow up in about 6 months (around 3/23/2023).    No orders of the defined types were placed in this encounter.        AMINAH Huitron MD/[facility]  Internal Medicine  [facility]  [phone]

## 2022-09-23 NOTE — ASSESSMENT & PLAN NOTE
Stable  Long term use of tranxene, which he is taking bid  --his current dose is 3.75 mg bid  --he asked for me to increase the dose to 7.5 mg daily (he gets it through [facility]) so that he can break them in half--continuing same dosage--to take 1/2 tablet twice daily

## 2022-09-26 ENCOUNTER — PATIENT MESSAGE (OUTPATIENT)
Dept: ADMINISTRATIVE | Facility: HOSPITAL | Age: 67
End: 2022-09-26
Payer: COMMERCIAL

## 2022-10-03 ENCOUNTER — TELEPHONE (OUTPATIENT)
Dept: PRIMARY CARE CLINIC | Facility: CLINIC | Age: 67
End: 2022-10-03

## 2022-12-08 ENCOUNTER — TELEPHONE (OUTPATIENT)
Dept: INTERNAL MEDICINE | Facility: CLINIC | Age: 67
End: 2022-12-08
Payer: COMMERCIAL

## 2022-12-08 NOTE — TELEPHONE ENCOUNTER
Spoke to pt wife she stated she is going to call the  facility where pt had coloscopy once she think of the name I provided pt wife with my fax number [phone] to have records fax to me

## 2022-12-08 NOTE — TELEPHONE ENCOUNTER
"----- Message from Blaise Cotto MD sent at 12/6/2022  1:55 PM CST -----  Regarding: colonoscopy  Helen,    Mr. Shaw underwent a colonoscopy in 2017 that was normal.  He is not due for a repeat colonoscopy until 2027.  Please get the report of the one from 2017 and get the EMR updated with that information.    You are making some progress here.  We are now at least in the "yellow."  I think if we get one more then we will be in the green.    MARIA T    "

## 2022-12-26 PROBLEM — Z00.00 HEALTHCARE MAINTENANCE: Status: RESOLVED | Noted: 2022-03-25 | Resolved: 2022-12-26

## 2022-12-30 DIAGNOSIS — F41.9 ANXIETY: ICD-10-CM

## 2022-12-30 RX ORDER — CLORAZEPATE DIPOTASSIUM 7.5 MG/1
7.5 TABLET ORAL DAILY
Qty: 30 TABLET | Refills: 5 | Status: CANCELLED | OUTPATIENT
Start: 2022-12-30

## 2023-03-24 ENCOUNTER — OFFICE VISIT (OUTPATIENT)
Dept: PRIMARY CARE CLINIC | Facility: CLINIC | Age: 68
End: 2023-03-24
Attending: INTERNAL MEDICINE
Payer: MEDICARE

## 2023-03-24 ENCOUNTER — LAB VISIT (OUTPATIENT)
Dept: LAB | Facility: HOSPITAL | Age: 68
End: 2023-03-24
Attending: INTERNAL MEDICINE
Payer: MEDICARE

## 2023-03-24 VITALS
WEIGHT: 173.75 LBS | HEIGHT: 68 IN | SYSTOLIC BLOOD PRESSURE: 138 MMHG | OXYGEN SATURATION: 99 % | DIASTOLIC BLOOD PRESSURE: 86 MMHG | HEART RATE: 53 BPM | BODY MASS INDEX: 26.33 KG/M2

## 2023-03-24 DIAGNOSIS — F51.04 CHRONIC INSOMNIA: ICD-10-CM

## 2023-03-24 DIAGNOSIS — I10 HYPERTENSION, UNSPECIFIED TYPE: ICD-10-CM

## 2023-03-24 DIAGNOSIS — F41.9 ANXIETY: ICD-10-CM

## 2023-03-24 DIAGNOSIS — E78.5 HYPERLIPIDEMIA, UNSPECIFIED HYPERLIPIDEMIA TYPE: ICD-10-CM

## 2023-03-24 DIAGNOSIS — G25.2 INTENTION TREMOR: ICD-10-CM

## 2023-03-24 LAB
ALBUMIN SERPL BCP-MCNC: 4.3 G/DL (ref 3.5–5.2)
ALP SERPL-CCNC: 78 U/L (ref 55–135)
ALT SERPL W/O P-5'-P-CCNC: 25 U/L (ref 10–44)
ANION GAP SERPL CALC-SCNC: 6 MMOL/L (ref 8–16)
AST SERPL-CCNC: 26 U/L (ref 10–40)
BILIRUB SERPL-MCNC: 0.8 MG/DL (ref 0.1–1)
BUN SERPL-MCNC: 27 MG/DL (ref 8–23)
CALCIUM SERPL-MCNC: 9.8 MG/DL (ref 8.7–10.5)
CHLORIDE SERPL-SCNC: 107 MMOL/L (ref 95–110)
CO2 SERPL-SCNC: 28 MMOL/L (ref 23–29)
CREAT SERPL-MCNC: 1.1 MG/DL (ref 0.5–1.4)
EST. GFR  (NO RACE VARIABLE): >60 ML/MIN/1.73 M^2
GLUCOSE SERPL-MCNC: 114 MG/DL (ref 70–110)
POTASSIUM SERPL-SCNC: 4.3 MMOL/L (ref 3.5–5.1)
PROT SERPL-MCNC: 7.3 G/DL (ref 6–8.4)
SODIUM SERPL-SCNC: 141 MMOL/L (ref 136–145)

## 2023-03-24 PROCEDURE — 80053 COMPREHEN METABOLIC PANEL: CPT | Performed by: INTERNAL MEDICINE

## 2023-03-24 PROCEDURE — 99999 PR PBB SHADOW E&M-EST. PATIENT-LVL III: ICD-10-PCS | Mod: PBBFAC,,, | Performed by: INTERNAL MEDICINE

## 2023-03-24 PROCEDURE — 99213 OFFICE O/P EST LOW 20 MIN: CPT | Mod: PBBFAC | Performed by: INTERNAL MEDICINE

## 2023-03-24 PROCEDURE — 36415 COLL VENOUS BLD VENIPUNCTURE: CPT | Performed by: INTERNAL MEDICINE

## 2023-03-24 PROCEDURE — 99999 PR PBB SHADOW E&M-EST. PATIENT-LVL III: CPT | Mod: PBBFAC,,, | Performed by: INTERNAL MEDICINE

## 2023-03-24 PROCEDURE — 99214 OFFICE O/P EST MOD 30 MIN: CPT | Mod: S$PBB,,, | Performed by: INTERNAL MEDICINE

## 2023-03-24 PROCEDURE — 99214 PR OFFICE/OUTPT VISIT, EST, LEVL IV, 30-39 MIN: ICD-10-PCS | Mod: S$PBB,,, | Performed by: INTERNAL MEDICINE

## 2023-03-24 RX ORDER — CLORAZEPATE DIPOTASSIUM 3.75 MG/1
3.75 TABLET ORAL 2 TIMES DAILY PRN
Qty: 180 TABLET | Refills: 1 | Status: SHIPPED | OUTPATIENT
Start: 2023-03-24 | End: 2023-09-22

## 2023-03-24 NOTE — PATIENT INSTRUCTIONS
PLEASE REMEMBER TO CALLL US FIRST with any medical questions or concerns. We try very hard to keep you out of the emergency room if we are able to see you and help with your concern. It is one of our many ways to keep our patients healthy.   For now the best way to reach us is by calling Helen's direct number:  CALL OUR OFFICE AT: [phone]    In the months to come we are also hoping to be available for scheduling via the portal.  Dr. Desean Sims and KEELEY Colindres are both here and available most days of the week if you need to be seen in person.  I am here every Friday but can also make myself available on other days if necessary as long as I have enough notice.  Please do not ever hesitate to reach to me via the patient portal.      Please let us know if you have any challenges getting scheduled with our behavioral therapist/ or dietitian or if you have any difficulty with any new medication(s) that were prescribed.     Dr. BASILIO

## 2023-03-24 NOTE — ASSESSMENT & PLAN NOTE
Has been on tranxene chronically for years  Asking to change back to the 3.75 mg tablet which he takes twice daily (instead of breaking 7.5 mg in half)  We discussed the long term risk of this medication because of his age, but he would like to continue taking it

## 2023-03-24 NOTE — ASSESSMENT & PLAN NOTE
Lipid panel from 9/22:  TG 52/HDL 45 for ratio of 1.15  LDL 58  On atorvastatin 10 mg  Due for recheck 9/23

## 2023-03-24 NOTE — ASSESSMENT & PLAN NOTE
On amlodipine 10 mg and olmesartan 40 mg  Hx of white coat hypertension  Due for CMP today (3/23/23) and again in 9/23

## 2023-03-24 NOTE — ASSESSMENT & PLAN NOTE
History of BZD and flexeril dependence   --as before, I do not see a compelling reason to stop at this time  --he has good decision-making capacity, understands the risk, and wants to continue taking as he has been chronically  --he has not requested a dosage increase under my care

## 2023-03-24 NOTE — PROGRESS NOTES
"Intensive Primary Care Provider Appointment  Shared Note: Helen Barreto & Blaise NOLASCO)    Subjective:      Patient ID: Tom Shaw is a 67 y.o. male.    Chief Complaint: Follow-up      HPI:    HPI  Here for follow-up.  He is asking about changing back to his tranxene 3.75 mg dose so that he doesn't have to break the pills in half anymore.    Had a UTI in 11/22 treated with macrodantin for one week followed by cipro after he saw some blood in his urine, seen by urology  Stopped taking naproxen in the evening since 2/8 (which he takes for chronic joint pains)  Had a sinus infection for which he went to urgent care and was treated with antibiotics with complete resolution of symptoms  No acute concerns or problems        Review of Systems  Review of Systems   Constitutional:  Negative for malaise/fatigue.   Respiratory:  Negative for shortness of breath.    Cardiovascular:  Negative for chest pain and palpitations.   Gastrointestinal:  Negative for blood in stool and melena.   Musculoskeletal:  Positive for joint pain (has been able to decrease his naproxen frequency). Negative for myalgias.       Objective:   Mental Health Screening  PHQ-9  Depression Patient Health Questionnaire (PHQ-9)  Over the last two weeks how often have you been bothered by little interest or pleasure in doing things: Not at all  Over the last two weeks how often have you been bothered by feeling down, depressed or hopeless: Not at all    HANY-7       Vital Signs  /86   Pulse (!) 53   Ht 5' 8" (1.727 m)   Wt 78.8 kg (173 lb 11.6 oz)   SpO2 99%   BMI 26.41 kg/m²   Physical Exam    Physical Exam  Vitals and nursing note reviewed.   Constitutional:       General: He is not in acute distress.     Appearance: Normal appearance. He is not ill-appearing.   HENT:      Head: Normocephalic.      Right Ear: Tympanic membrane normal.      Left Ear: Tympanic membrane normal.   Eyes:      General: No scleral icterus.     " Conjunctiva/sclera: Conjunctivae normal.   Neck:      Vascular: No carotid bruit.   Cardiovascular:      Rate and Rhythm: Regular rhythm. Bradycardia present.      Heart sounds: No murmur heard.  Pulmonary:      Effort: Pulmonary effort is normal.      Breath sounds: Normal breath sounds.   Abdominal:      Palpations: Abdomen is soft.      Tenderness: There is no abdominal tenderness.   Musculoskeletal:      Cervical back: Normal range of motion and neck supple.      Right lower leg: No edema.      Left lower leg: No edema.   Skin:     General: Skin is warm and dry.   Neurological:      Mental Status: He is alert and oriented to person, place, and time. Mental status is at baseline.   Psychiatric:         Mood and Affect: Mood normal.         Behavior: Behavior normal.         Assessment:   67 y.o. male here for primary care visit       Plan:   1. Hyperlipidemia, unspecified hyperlipidemia type  Assessment & Plan:  Lipid panel from 9/22:  TG 52/HDL 45 for ratio of 1.15  LDL 58  On atorvastatin 10 mg  Due for recheck 9/23      2. Hypertension, unspecified type  Assessment & Plan:  On amlodipine 10 mg and olmesartan 40 mg  Hx of white coat hypertension  Due for CMP today (3/23/23) and again in 9/23    Orders:  -     COMPREHENSIVE METABOLIC PANEL; Future; Expected date: 03/24/2023    3. Anxiety  Assessment & Plan:  Has been on tranxene chronically for years  Asking to change back to the 3.75 mg tablet which he takes twice daily (instead of breaking 7.5 mg in half)  We discussed the long term risk of this medication because of his age, but he would like to continue taking it    Orders:  -     clorazepate (TRANXENE) 3.75 MG Tab; Take 1 tablet (3.75 mg total) by mouth 2 (two) times daily as needed.  Dispense: 180 tablet; Refill: 1    4. Chronic insomnia  Assessment & Plan:  History of BZD and flexeril dependence   --as before, I do not see a compelling reason to stop at this time  --he has good decision-making capacity,  understands the risk, and wants to continue taking as he has been chronically  --he has not requested a dosage increase under my care      5. Intention tremor  Assessment & Plan:  About the same  Adequate therapeutic response to beta-blocker  HR in low 50's without any light-headedness or near syncope or syncope        Follow up in about 6 months (around 9/24/2023).    Orders Placed This Encounter   Procedures    COMPREHENSIVE METABOLIC PANEL     Standing Status:   Future     Standing Expiration Date:   5/22/2024       Health Maintenance  Health Maintenance         Date Due Completion Date    Hepatitis C Screening Never done ---    COVID-19 Vaccine (1) Never done ---    TETANUS VACCINE Never done ---    Influenza Vaccine (1) 09/01/2022 11/3/2021    Colorectal Cancer Screening 01/01/2027 (Originally 1955) ---    Hemoglobin A1c (Diabetic Prevention Screening) 12/09/2023 12/9/2020    Lipid Panel 09/23/2027 9/23/2022            AMINAH Huitron MD/[facility]  Internal Medicine  [facility]  [phone]

## 2023-05-15 RX ORDER — NAPROXEN 500 MG/1
TABLET ORAL
Qty: 180 TABLET | Refills: 3 | Status: SHIPPED | OUTPATIENT
Start: 2023-05-15

## 2023-06-01 RX ORDER — PANTOPRAZOLE SODIUM 40 MG/1
TABLET, DELAYED RELEASE ORAL
Qty: 90 TABLET | Refills: 3 | Status: SHIPPED | OUTPATIENT
Start: 2023-06-01 | End: 2024-03-07 | Stop reason: SDUPTHER

## 2023-09-22 DIAGNOSIS — F41.9 ANXIETY: ICD-10-CM

## 2023-09-22 RX ORDER — CLORAZEPATE DIPOTASSIUM 3.75 MG/1
3.75 TABLET ORAL 2 TIMES DAILY PRN
Qty: 60 TABLET | Refills: 3 | Status: SHIPPED | OUTPATIENT
Start: 2023-09-22 | End: 2024-01-18 | Stop reason: SDUPTHER

## 2023-10-19 NOTE — ASSESSMENT & PLAN NOTE
Current regimen:  -amlodipine 10 mg  -olmesartan 40 mg  Hx of white coat HTN    BMP  Lab Results   Component Value Date     03/24/2023    K 4.3 03/24/2023     03/24/2023    CO2 28 03/24/2023    BUN 27 (H) 03/24/2023    CREATININE 1.1 03/24/2023    CALCIUM 9.8 03/24/2023    ANIONGAP 6 (L) 03/24/2023    EGFRNORACEVR >60.0 03/24/2023     **continue present regimen  **Recheck CMP now

## 2023-10-19 NOTE — ASSESSMENT & PLAN NOTE
History of chronic benzodiazepine and flexeril use/dependence  Although they are both high risk medications in older adults, the patient is adamantly opposed to stopping either one  He is aware of and accepting of the risks  He also has not asked for a dosage increase in a long time  -he takes tranxene 7.5 mg daily and flexeril 10 mg nightly

## 2023-10-19 NOTE — PROGRESS NOTES
"Intensive Primary Care Provider Appointment  Shared Note: Helen Barreto & Blaise NOLASCO)    Subjective:      Patient ID: Tom Shaw is a 68 y.o. male.    Chief Complaint: Follow-up      HPI:    HPI  Here for 6-month follow-up.   Has been keeping himself busy with various repair projects around the house.   He developed a "perineal cyst" that was evaluated by urology, felt to be infected, which was treated with antibiotics.       He did smoke in the past -- averaging less than a pack per day for 4 years -- but had undergone AAA US screening in the past.        CMP  Sodium   Date Value Ref Range Status   03/24/2023 141 136 - 145 mmol/L Final     Potassium   Date Value Ref Range Status   03/24/2023 4.3 3.5 - 5.1 mmol/L Final     Chloride   Date Value Ref Range Status   03/24/2023 107 95 - 110 mmol/L Final     CO2   Date Value Ref Range Status   03/24/2023 28 23 - 29 mmol/L Final     Glucose   Date Value Ref Range Status   03/24/2023 114 (H) 70 - 110 mg/dL Final     BUN   Date Value Ref Range Status   03/24/2023 27 (H) 8 - 23 mg/dL Final     Creatinine   Date Value Ref Range Status   03/24/2023 1.1 0.5 - 1.4 mg/dL Final     Calcium   Date Value Ref Range Status   03/24/2023 9.8 8.7 - 10.5 mg/dL Final     Total Protein   Date Value Ref Range Status   03/24/2023 7.3 6.0 - 8.4 g/dL Final     Albumin   Date Value Ref Range Status   03/24/2023 4.3 3.5 - 5.2 g/dL Final     Total Bilirubin   Date Value Ref Range Status   03/24/2023 0.8 0.1 - 1.0 mg/dL Final     Comment:     For infants and newborns, interpretation of results should be based  on gestational age, weight and in agreement with clinical  observations.    Premature Infant recommended reference ranges:  Up to 24 hours.............<8.0 mg/dL  Up to 48 hours............<12.0 mg/dL  3-5 days..................<15.0 mg/dL  6-29 days.................<15.0 mg/dL       Alkaline Phosphatase   Date Value Ref Range Status   03/24/2023 78 55 - 135 U/L Final     AST " "  Date Value Ref Range Status   03/24/2023 26 10 - 40 U/L Final     ALT   Date Value Ref Range Status   03/24/2023 25 10 - 44 U/L Final     Anion Gap   Date Value Ref Range Status   03/24/2023 6 (L) 8 - 16 mmol/L Final     eGFR   Date Value Ref Range Status   03/24/2023 >60.0 >60 mL/min/1.73 m^2 Final               Review of Systems  Review of Systems   Constitutional:  Negative for malaise/fatigue.   HENT:  Negative for hearing loss.    Eyes:  Negative for blurred vision.   Respiratory:  Negative for shortness of breath.    Cardiovascular:  Negative for chest pain.   Gastrointestinal:  Negative for blood in stool, heartburn, nausea and vomiting.   Musculoskeletal:  Positive for joint pain (chronic, unchanged). Negative for falls and myalgias.   Neurological:  Negative for dizziness and headaches.         Objective:       Wt Readings from Last 6 Encounters:   10/20/23 80.6 kg (177 lb 11.1 oz)   03/24/23 78.8 kg (173 lb 11.6 oz)   09/23/22 78.5 kg (173 lb 1 oz)   03/25/22 80.1 kg (176 lb 9.4 oz)   02/25/22 79.8 kg (175 lb 14.8 oz)   09/10/21 86.4 kg (190 lb 7.6 oz)     BP Readings from Last 4 Encounters:   10/20/23 138/86   03/24/23 138/86   09/23/22 128/81   03/25/22 138/80       Vital Signs  /86 (BP Location: Left arm, Patient Position: Sitting, BP Method: Medium (Manual))   Resp 18   Ht 5' 8" (1.727 m)   Wt 80.6 kg (177 lb 11.1 oz)   BMI 27.02 kg/m²   Physical Exam  Physical Exam  Vitals and nursing note reviewed.   Constitutional:       Appearance: Normal appearance. He is not ill-appearing.   HENT:      Head: Normocephalic.   Eyes:      Conjunctiva/sclera: Conjunctivae normal.   Neck:      Vascular: No carotid bruit.   Cardiovascular:      Rate and Rhythm: Normal rate and regular rhythm.      Heart sounds:      No gallop.   Pulmonary:      Effort: Pulmonary effort is normal.      Breath sounds: Normal breath sounds.   Abdominal:      General: Bowel sounds are normal.      Palpations: Abdomen is soft. "      Tenderness: There is no abdominal tenderness.   Musculoskeletal:      Cervical back: Normal range of motion and neck supple.      Right lower leg: No edema.      Left lower leg: No edema.   Lymphadenopathy:      Cervical: No cervical adenopathy.   Skin:     General: Skin is warm and dry.   Neurological:      Mental Status: He is alert and oriented to person, place, and time. Mental status is at baseline.   Psychiatric:         Mood and Affect: Mood normal.         Behavior: Behavior normal.           Assessment:   68 y.o. male here for primary care visit       Plan:   1. Hypertension, unspecified type  Assessment & Plan:  Current regimen:  -amlodipine 10 mg  -olmesartan 40 mg  Hx of white coat HTN    BMP  Lab Results   Component Value Date     03/24/2023    K 4.3 03/24/2023     03/24/2023    CO2 28 03/24/2023    BUN 27 (H) 03/24/2023    CREATININE 1.1 03/24/2023    CALCIUM 9.8 03/24/2023    ANIONGAP 6 (L) 03/24/2023    EGFRNORACEVR >60.0 03/24/2023     **continue present regimen  **Recheck CMP now      Orders:  -     COMPREHENSIVE METABOLIC PANEL; Future; Expected date: 10/20/2023  -     olmesartan (BENICAR) 40 MG tablet; Take 1 tablet (40 mg total) by mouth once daily.  Dispense: 90 tablet; Refill: 3    2. Hyperlipidemia, unspecified hyperlipidemia type  Assessment & Plan:  Lab Results   Component Value Date    LDLCALC 57.6 (L) 09/23/2022   on atorvastatin 10 mg  **due for recheck LDL now      Orders:  -     COMPREHENSIVE METABOLIC PANEL; Future; Expected date: 10/20/2023  -     LIPID PANEL; Future; Expected date: 10/20/2023    3. Chronic insomnia  Assessment & Plan:  History of chronic benzodiazepine and flexeril use/dependence  Although they are both high risk medications in older adults, the patient is adamantly opposed to stopping either one  He is aware of and accepting of the risks  He also has not asked for a dosage increase in a long time  -he takes tranxene 7.5 mg daily and flexeril 10 mg  nightly      4. Intention tremor  Assessment & Plan:  On inderal LA 60 mg daily which seems to be working well enough for him      5. Healthcare maintenance  Assessment & Plan:  Due for influenza vaccine and also Td vaccine    Orders:  -     Influenza (FLUAD) - Quadrivalent (Adjuvanted) *Preferred* (65+) (PF)      Follow up in about 6 months (around 4/20/2024).    Orders Placed This Encounter   Procedures    Influenza (FLUAD) - Quadrivalent (Adjuvanted) *Preferred* (65+) (PF)    COMPREHENSIVE METABOLIC PANEL     Standing Status:   Future     Standing Expiration Date:   12/17/2024    LIPID PANEL     Standing Status:   Future     Standing Expiration Date:   12/17/2024       Health Maintenance  Health Maintenance         Date Due Completion Date    Hepatitis C Screening Never done ---    COVID-19 Vaccine (1) Never done ---    TETANUS VACCINE Never done ---    Influenza Vaccine (1) 09/01/2023 11/3/2021    Colorectal Cancer Screening 01/01/2027 (Originally 1955) ---    Hemoglobin A1c (Diabetic Prevention Screening) 12/09/2023 12/9/2020    Lipid Panel 09/23/2027 9/23/2022            AMINAH Huitron MD/[facility] [place]  Internal Medicine  [facility]  [phone]

## 2023-10-19 NOTE — ASSESSMENT & PLAN NOTE
Lab Results   Component Value Date    LDLCALC 57.6 (L) 09/23/2022   on atorvastatin 10 mg  **due for recheck LDL now

## 2023-10-20 ENCOUNTER — OFFICE VISIT (OUTPATIENT)
Dept: PRIMARY CARE CLINIC | Facility: CLINIC | Age: 68
End: 2023-10-20
Attending: INTERNAL MEDICINE
Payer: MEDICARE

## 2023-10-20 VITALS
HEIGHT: 68 IN | SYSTOLIC BLOOD PRESSURE: 138 MMHG | WEIGHT: 177.69 LBS | DIASTOLIC BLOOD PRESSURE: 86 MMHG | BODY MASS INDEX: 26.93 KG/M2 | RESPIRATION RATE: 18 BRPM

## 2023-10-20 DIAGNOSIS — F51.04 CHRONIC INSOMNIA: ICD-10-CM

## 2023-10-20 DIAGNOSIS — E78.5 HYPERLIPIDEMIA, UNSPECIFIED HYPERLIPIDEMIA TYPE: ICD-10-CM

## 2023-10-20 DIAGNOSIS — Z00.00 HEALTHCARE MAINTENANCE: ICD-10-CM

## 2023-10-20 DIAGNOSIS — I10 HYPERTENSION, UNSPECIFIED TYPE: ICD-10-CM

## 2023-10-20 DIAGNOSIS — G25.2 INTENTION TREMOR: ICD-10-CM

## 2023-10-20 PROCEDURE — G0008 ADMIN INFLUENZA VIRUS VAC: HCPCS | Mod: S$GLB,,, | Performed by: INTERNAL MEDICINE

## 2023-10-20 PROCEDURE — 99214 OFFICE O/P EST MOD 30 MIN: CPT | Mod: S$GLB,,, | Performed by: INTERNAL MEDICINE

## 2023-10-20 PROCEDURE — 90694 FLU VACCINE - QUADRIVALENT - ADJUVANTED: ICD-10-PCS | Mod: S$GLB,,, | Performed by: INTERNAL MEDICINE

## 2023-10-20 PROCEDURE — 99214 PR OFFICE/OUTPT VISIT, EST, LEVL IV, 30-39 MIN: ICD-10-PCS | Mod: S$GLB,,, | Performed by: INTERNAL MEDICINE

## 2023-10-20 PROCEDURE — 90694 VACC AIIV4 NO PRSRV 0.5ML IM: CPT | Mod: S$GLB,,, | Performed by: INTERNAL MEDICINE

## 2023-10-20 PROCEDURE — G0008 FLU VACCINE - QUADRIVALENT - ADJUVANTED: ICD-10-PCS | Mod: S$GLB,,, | Performed by: INTERNAL MEDICINE

## 2023-10-20 RX ORDER — OLMESARTAN MEDOXOMIL 40 MG/1
40 TABLET ORAL DAILY
Qty: 90 TABLET | Refills: 3 | Status: SHIPPED | OUTPATIENT
Start: 2023-10-20 | End: 2024-01-18

## 2023-10-20 NOTE — PROGRESS NOTES
After obtaining consent, and per orders of Dr. Cotto, injection of Influenza (FLUAD) - Quadrivalent (Adjuvanted) *Preferred* (65+) (PF) given by GERDA ETIENNE. Patient instructed to remain in clinic for 15 minutes afterwards, and to report any adverse reaction to staff immediately. Patient tolerated well

## 2023-10-20 NOTE — PATIENT INSTRUCTIONS
PLEASE REMEMBER TO CALLL US FIRST with any medical questions or concerns. We try very hard to keep you out of the emergency room if we are able to see you and help with your concern. It is one of our many ways to keep our patients healthy.   For now the best way to reach us is by calling Helen's direct number:  CALL OUR OFFICE AT: [phone]    You can also schedule with us through the portal.   Dr. Desean Sims and KEELEY Colindres are both here and available most days of the week if you need to be seen in person.  I am here every Friday but can also make myself available on other days if necessary as long as I have enough notice.  Please do not ever hesitate to reach to me via the patient portal.      Please let us know if you have any challenges getting scheduled with our behavioral therapist/ or dietitian or if you have any difficulty with any new medication(s) that were prescribed.     Dr. BASILIO

## 2023-10-23 ENCOUNTER — PATIENT MESSAGE (OUTPATIENT)
Dept: PRIMARY CARE CLINIC | Facility: CLINIC | Age: 68
End: 2023-10-23
Payer: COMMERCIAL

## 2023-11-14 DIAGNOSIS — I10 HYPERTENSION, UNSPECIFIED TYPE: ICD-10-CM

## 2023-11-14 RX ORDER — AMLODIPINE BESYLATE 10 MG/1
10 TABLET ORAL
Qty: 90 TABLET | Refills: 3 | Status: SHIPPED | OUTPATIENT
Start: 2023-11-14 | End: 2024-03-07 | Stop reason: SDUPTHER

## 2023-11-16 DIAGNOSIS — G25.2 INTENTION TREMOR: ICD-10-CM

## 2023-11-16 DIAGNOSIS — Z91.89 AT INCREASED RISK FOR CARDIOVASCULAR DISEASE: ICD-10-CM

## 2023-11-16 RX ORDER — PROPRANOLOL HYDROCHLORIDE 60 MG/1
CAPSULE, EXTENDED RELEASE ORAL
Qty: 90 CAPSULE | Refills: 3 | Status: SHIPPED | OUTPATIENT
Start: 2023-11-16 | End: 2024-03-07 | Stop reason: SDUPTHER

## 2023-11-16 RX ORDER — ATORVASTATIN CALCIUM 10 MG/1
TABLET, FILM COATED ORAL
Qty: 90 TABLET | Refills: 3 | Status: SHIPPED | OUTPATIENT
Start: 2023-11-16 | End: 2024-03-07 | Stop reason: SDUPTHER

## 2023-12-07 NOTE — ASSESSMENT & PLAN NOTE
About the same  Adequate therapeutic response to beta-blocker  HR in low 50's without any light-headedness or near syncope or syncope   AMB   5/15/2024  9:50 AM Edie Rizvi MD [facility] AMB

## 2023-12-15 RX ORDER — CYCLOBENZAPRINE HCL 10 MG
TABLET ORAL
Qty: 30 TABLET | Refills: 5 | Status: SHIPPED | OUTPATIENT
Start: 2023-12-15 | End: 2024-03-07 | Stop reason: SDUPTHER

## 2024-01-16 DIAGNOSIS — I10 HYPERTENSION, UNSPECIFIED TYPE: ICD-10-CM

## 2024-01-18 ENCOUNTER — TELEPHONE (OUTPATIENT)
Dept: PRIMARY CARE CLINIC | Facility: CLINIC | Age: 69
End: 2024-01-18
Payer: COMMERCIAL

## 2024-01-18 DIAGNOSIS — F41.9 ANXIETY: ICD-10-CM

## 2024-01-18 RX ORDER — OLMESARTAN MEDOXOMIL 40 MG/1
40 TABLET ORAL
Qty: 90 TABLET | Refills: 0 | Status: SHIPPED | OUTPATIENT
Start: 2024-01-18 | End: 2024-03-07 | Stop reason: SDUPTHER

## 2024-01-18 RX ORDER — CLORAZEPATE DIPOTASSIUM 3.75 MG/1
3.75 TABLET ORAL 2 TIMES DAILY PRN
Qty: 60 TABLET | Refills: 0 | Status: SHIPPED | OUTPATIENT
Start: 2024-01-18 | End: 2024-02-21 | Stop reason: SDUPTHER

## 2024-01-22 PROBLEM — Z00.00 HEALTHCARE MAINTENANCE: Status: RESOLVED | Noted: 2022-03-25 | Resolved: 2024-01-22

## 2024-02-21 DIAGNOSIS — F41.9 ANXIETY: ICD-10-CM

## 2024-02-21 RX ORDER — CLORAZEPATE DIPOTASSIUM 3.75 MG/1
3.75 TABLET ORAL 2 TIMES DAILY PRN
Qty: 60 TABLET | Refills: 5 | Status: SHIPPED | OUTPATIENT
Start: 2024-02-21 | End: 2024-08-19

## 2024-02-21 RX ORDER — CLORAZEPATE DIPOTASSIUM 3.75 MG/1
3.75 TABLET ORAL 2 TIMES DAILY PRN
Qty: 60 TABLET | Refills: 0 | OUTPATIENT
Start: 2024-02-21

## 2024-02-21 NOTE — ASSESSMENT & PLAN NOTE
New Rx for Tranxene sent on 2/21/24 for #60 x 5  Aware that it is a BEERS list medication, but he understands the risk and wishes to continue taking it

## 2024-03-07 ENCOUNTER — OFFICE VISIT (OUTPATIENT)
Dept: PRIMARY CARE CLINIC | Facility: CLINIC | Age: 69
End: 2024-03-07
Payer: MEDICARE

## 2024-03-07 VITALS
HEART RATE: 52 BPM | SYSTOLIC BLOOD PRESSURE: 138 MMHG | DIASTOLIC BLOOD PRESSURE: 82 MMHG | WEIGHT: 179.25 LBS | BODY MASS INDEX: 27.25 KG/M2

## 2024-03-07 DIAGNOSIS — E78.5 HYPERLIPIDEMIA, UNSPECIFIED HYPERLIPIDEMIA TYPE: ICD-10-CM

## 2024-03-07 DIAGNOSIS — Z85.46 HISTORY OF PROSTATE CANCER: ICD-10-CM

## 2024-03-07 DIAGNOSIS — Z91.89 AT INCREASED RISK FOR CARDIOVASCULAR DISEASE: ICD-10-CM

## 2024-03-07 DIAGNOSIS — F51.04 CHRONIC INSOMNIA: ICD-10-CM

## 2024-03-07 DIAGNOSIS — M16.0 PRIMARY OSTEOARTHRITIS OF BOTH HIPS: ICD-10-CM

## 2024-03-07 DIAGNOSIS — I10 HYPERTENSION, UNSPECIFIED TYPE: Primary | ICD-10-CM

## 2024-03-07 DIAGNOSIS — F41.9 ANXIETY: ICD-10-CM

## 2024-03-07 DIAGNOSIS — R73.09 ELEVATED RANDOM BLOOD GLUCOSE LEVEL: ICD-10-CM

## 2024-03-07 DIAGNOSIS — G25.2 INTENTION TREMOR: ICD-10-CM

## 2024-03-07 DIAGNOSIS — E66.3 OVERWEIGHT (BMI 25.0-29.9): ICD-10-CM

## 2024-03-07 LAB
ANION GAP SERPL CALC-SCNC: 8 MMOL/L (ref 8–16)
BUN SERPL-MCNC: 14 MG/DL (ref 8–23)
CALCIUM SERPL-MCNC: 10.3 MG/DL (ref 8.7–10.5)
CHLORIDE SERPL-SCNC: 105 MMOL/L (ref 95–110)
CO2 SERPL-SCNC: 27 MMOL/L (ref 23–29)
CREAT SERPL-MCNC: 1 MG/DL (ref 0.5–1.4)
EST. GFR  (NO RACE VARIABLE): >60 ML/MIN/1.73 M^2
ESTIMATED AVG GLUCOSE: 111 MG/DL (ref 68–131)
GLUCOSE SERPL-MCNC: 91 MG/DL (ref 70–110)
HBA1C MFR BLD: 5.5 % (ref 4–5.6)
POTASSIUM SERPL-SCNC: 4.9 MMOL/L (ref 3.5–5.1)
SODIUM SERPL-SCNC: 140 MMOL/L (ref 136–145)

## 2024-03-07 PROCEDURE — 99999 PR PBB SHADOW E&M-EST. PATIENT-LVL II: CPT | Mod: PBBFAC,,, | Performed by: INTERNAL MEDICINE

## 2024-03-07 PROCEDURE — 99214 OFFICE O/P EST MOD 30 MIN: CPT | Mod: S$PBB,,, | Performed by: INTERNAL MEDICINE

## 2024-03-07 PROCEDURE — 83036 HEMOGLOBIN GLYCOSYLATED A1C: CPT | Performed by: INTERNAL MEDICINE

## 2024-03-07 PROCEDURE — 80048 BASIC METABOLIC PNL TOTAL CA: CPT | Performed by: INTERNAL MEDICINE

## 2024-03-07 PROCEDURE — 99212 OFFICE O/P EST SF 10 MIN: CPT | Mod: PBBFAC,PN | Performed by: INTERNAL MEDICINE

## 2024-03-07 RX ORDER — CYCLOBENZAPRINE HCL 10 MG
10 TABLET ORAL NIGHTLY
Qty: 90 TABLET | Refills: 3 | Status: SHIPPED | OUTPATIENT
Start: 2024-03-07

## 2024-03-07 RX ORDER — AMLODIPINE BESYLATE 10 MG/1
10 TABLET ORAL DAILY
Qty: 90 TABLET | Refills: 3 | Status: SHIPPED | OUTPATIENT
Start: 2024-03-07

## 2024-03-07 RX ORDER — OLMESARTAN MEDOXOMIL 40 MG/1
40 TABLET ORAL DAILY
Qty: 90 TABLET | Refills: 3 | Status: SHIPPED | OUTPATIENT
Start: 2024-03-07

## 2024-03-07 RX ORDER — ATORVASTATIN CALCIUM 10 MG/1
10 TABLET, FILM COATED ORAL DAILY
Qty: 90 TABLET | Refills: 3 | Status: SHIPPED | OUTPATIENT
Start: 2024-03-07

## 2024-03-07 RX ORDER — PANTOPRAZOLE SODIUM 40 MG/1
40 TABLET, DELAYED RELEASE ORAL DAILY
Qty: 90 TABLET | Refills: 3 | Status: SHIPPED | OUTPATIENT
Start: 2024-03-07

## 2024-03-07 RX ORDER — PROPRANOLOL HYDROCHLORIDE 60 MG/1
60 CAPSULE, EXTENDED RELEASE ORAL DAILY
Qty: 90 CAPSULE | Refills: 3 | Status: SHIPPED | OUTPATIENT
Start: 2024-03-07

## 2024-03-07 NOTE — ASSESSMENT & PLAN NOTE
With history of elevated blood glucose  **Will check A1c    --his weight has been stable over the past 6 months

## 2024-03-07 NOTE — ASSESSMENT & PLAN NOTE
BMP  Lab Results   Component Value Date     10/20/2023    K 4.6 10/20/2023     10/20/2023    CO2 23 10/20/2023    BUN 22 10/20/2023    CREATININE 1.1 10/20/2023    CALCIUM 9.8 10/20/2023    ANIONGAP 10 10/20/2023    EGFRNORACEVR >60.0 10/20/2023   --watching renal function closely because of potential nephrotoxicity from both NSAID and PPI

## 2024-03-07 NOTE — PROGRESS NOTES
Intensive Primary Care Provider Appointment  Shared Note: Helen Barreto & Blaise NOLASCO)    Subjective:      Patient ID: Tom Shaw is a 68 y.o. male.    Chief Complaint: No chief complaint on file.      HPI:    HPI  Here for 6-month follow-up.  No acute concerns or problems, but he did have some recent health issues over the past several weeks.     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  He did have an infection that required treatment with antibiotics -- he took courses of levaquin, clindamycin, and amoxicillin.  He has not had any diarrhea or other symptoms of C. Diff, but he did develop a yeast infection for which he took fluconazole and nystatin.  Now doing well without any further issues.     Also injured his right finger working on his  that resulted in an infection.  Cultures came back + for MRSA that resolved with antimicrobial treatment.  He is completing a course of doxycycline currently.     No falls or balance issues.  No cognitive issues, he reports good short term memory.  He is still very active working around his property.  He is completely independent with all of his ADLs.       Review of Systems  Review of Systems   Constitutional:  Negative for chills, fever, malaise/fatigue and weight loss.   Respiratory:  Negative for cough and shortness of breath.    Cardiovascular:  Negative for chest pain, palpitations and orthopnea.   Gastrointestinal:  Negative for abdominal pain, nausea and vomiting.   Genitourinary:  Negative for dysuria, frequency and urgency.   Musculoskeletal:  Positive for joint pain (chronic arthralgias). Negative for myalgias.   Neurological:  Negative for dizziness and headaches.   Psychiatric/Behavioral:  The patient has insomnia (chronic, stable).          Objective:     Vital Signs  /82 (BP Location: Left arm, Patient Position: Sitting)   Pulse (!) 52   Wt 81.3 kg (179 lb 3.7 oz)    BMI 27.25 kg/m²   Physical Exam  Physical Exam  Vitals and nursing note reviewed.   Constitutional:       Appearance: Normal appearance.   HENT:      Head: Normocephalic.      Right Ear: Tympanic membrane normal.      Left Ear: Tympanic membrane normal.      Mouth/Throat:      Mouth: Mucous membranes are moist.   Eyes:      General: No scleral icterus.     Conjunctiva/sclera: Conjunctivae normal.   Cardiovascular:      Rate and Rhythm: Normal rate and regular rhythm.      Heart sounds: No murmur heard.     No gallop.   Pulmonary:      Effort: Pulmonary effort is normal.      Breath sounds: Normal breath sounds.   Abdominal:      Palpations: Abdomen is soft.      Tenderness: There is no abdominal tenderness.   Musculoskeletal:      Cervical back: Normal range of motion and neck supple.      Right lower leg: No edema.      Left lower leg: No edema.   Skin:     General: Skin is warm and dry.   Neurological:      Mental Status: He is alert and oriented to person, place, and time. Mental status is at baseline.      Sensory: No sensory deficit.      Motor: No weakness.   Psychiatric:         Mood and Affect: Mood normal.         Behavior: Behavior normal.           Assessment:   68 y.o. male here for primary care visit       Plan:   1. Hypertension, unspecified type  Overview:  Current regimen:  -amlodipine 10 mg  -olmesartan 40 mg  Hx of white coat HTN    Assessment & Plan:  BMP  Lab Results   Component Value Date     10/20/2023    K 4.6 10/20/2023     10/20/2023    CO2 23 10/20/2023    BUN 22 10/20/2023    CREATININE 1.1 10/20/2023    CALCIUM 9.8 10/20/2023    ANIONGAP 10 10/20/2023    EGFRNORACEVR >60.0 10/20/2023     **due for recheck BMP      Orders:  -     BASIC METABOLIC PANEL; Future; Expected date: 03/07/2024  -     olmesartan (BENICAR) 40 MG tablet; Take 1 tablet (40 mg total) by mouth once daily.  Dispense: 90 tablet; Refill: 3  -     amLODIPine (NORVASC) 10 MG tablet; Take 1 tablet (10 mg total)  by mouth once daily.  Dispense: 90 tablet; Refill: 3    2. Hyperlipidemia, unspecified hyperlipidemia type  Overview:  Current regimen:  -Lipitor 10 mg daily    Assessment & Plan:  Lab Results   Component Value Date    LDLCALC 53.0 (L) 10/20/2023   LDL at goal        3. Anxiety  Overview:  Has been on Tranxene 3.75 mg bid prn anxiety chronically for many years  Patient and provider both aware that it is a BEERS list high-risk medication, but patient understands risks and wishes to continue Rx    Assessment & Plan:  His 6-month supply of Tranxene was filled again in 2/2024 (#60 x 5)      4. Intention tremor  Overview:  On Inderal LA 60 mg    Assessment & Plan:  Current dose of beta-blocker has worked well for him    Orders:  -     propranoloL (INDERAL LA) 60 MG 24 hr capsule; Take 1 capsule (60 mg total) by mouth once daily.  Dispense: 90 capsule; Refill: 3    5. Primary osteoarthritis of both hips  Overview:  S/P bilateral total hip arthroplasty  On NSAID chronically with gastroprotection from PPI  Also on chronic flexeril as muscle relaxant    Assessment & Plan:  BMP  Lab Results   Component Value Date     10/20/2023    K 4.6 10/20/2023     10/20/2023    CO2 23 10/20/2023    BUN 22 10/20/2023    CREATININE 1.1 10/20/2023    CALCIUM 9.8 10/20/2023    ANIONGAP 10 10/20/2023    EGFRNORACEVR >60.0 10/20/2023   --watching renal function closely because of potential nephrotoxicity from both NSAID and PPI        6. Chronic insomnia  Overview:  History of chronic benzodiazepine and flexeril use/dependence  Although they are both high risk medications in older adults, the patient is adamantly opposed to stopping either one  He is aware of and accepting of the risks  He also has not asked for a dosage increase in a long time  -he takes tranxene 3.75 mg twice daily and flexeril 10 mg nightly      7. Overweight (BMI 25.0-29.9)  Assessment & Plan:  With history of elevated blood glucose  **Will check A1c    --his  weight has been stable over the past 6 months        8. History of prostate cancer  Assessment & Plan:  S/P prostatectomy by Dr. Ortega at [facility]      9. Elevated random blood glucose level  -     HEMOGLOBIN A1C; Future; Expected date: 03/07/2024    10. At increased risk for cardiovascular disease  -     atorvastatin (LIPITOR) 10 MG tablet; Take 1 tablet (10 mg total) by mouth once daily.  Dispense: 90 tablet; Refill: 3    Other orders  -     cyclobenzaprine (FLEXERIL) 10 MG tablet; Take 1 tablet (10 mg total) by mouth every evening.  Dispense: 90 tablet; Refill: 3  -     pantoprazole (PROTONIX) 40 MG tablet; Take 1 tablet (40 mg total) by mouth once daily.  Dispense: 90 tablet; Refill: 3      Follow up in about 6 months (around 9/7/2024).    Blaise Cotto MD/[facility]  Internal Medicine  [facility]

## 2024-03-07 NOTE — ASSESSMENT & PLAN NOTE
BMP  Lab Results   Component Value Date     10/20/2023    K 4.6 10/20/2023     10/20/2023    CO2 23 10/20/2023    BUN 22 10/20/2023    CREATININE 1.1 10/20/2023    CALCIUM 9.8 10/20/2023    ANIONGAP 10 10/20/2023    EGFRNORACEVR >60.0 10/20/2023     **due for recheck BMP

## 2024-03-07 NOTE — PATIENT INSTRUCTIONS
PLEASE REMEMBER TO CALLL US FIRST with any medical questions or concerns. We try very hard to keep you out of the emergency room if we are able to see you and help with your concern. It is one of our many ways to keep our patients healthy.   For now the best way to reach us is by calling Helen's direct number:  CALL OUR OFFICE AT: [phone]    You can also schedule with us through the portal.   Dr. Desean Sims and KEELEY Colindres are both here and available most days of the week if you need to be seen in person.  I am here every Friday but can also make myself available on other days if necessary as long as I have enough notice.  Please do not ever hesitate to reach to me via the patient portal.      Please let us know if you have any challenges getting scheduled with our behavioral therapist/ or dietitian or if you have any difficulty with any new medication(s) that were prescribed.     Dr. BASILIO

## 2024-04-22 RX ORDER — NAPROXEN 500 MG/1
TABLET ORAL
Qty: 180 TABLET | Refills: 3 | Status: SHIPPED | OUTPATIENT
Start: 2024-04-22

## 2024-06-10 PROBLEM — Z00.00 HEALTHCARE MAINTENANCE: Status: RESOLVED | Noted: 2022-03-25 | Resolved: 2024-06-10

## 2024-08-19 DIAGNOSIS — F41.9 ANXIETY: ICD-10-CM

## 2024-08-19 RX ORDER — CLORAZEPATE DIPOTASSIUM 3.75 MG/1
3.75 TABLET ORAL 2 TIMES DAILY PRN
Qty: 60 TABLET | Refills: 5 | Status: SHIPPED | OUTPATIENT
Start: 2024-08-19 | End: 2025-02-15

## 2024-09-17 NOTE — PROGRESS NOTES
[facility] Provider Appointment  Blaise NOLASCO)    Subjective:     History of Present Illness    CHIEF COMPLAINT:  Tom presents today for follow up.    MUSCULOSKELETAL:  He reports ongoing shoulder pain due to osteoarthritis, exacerbated by overuse such as using a zero-turn lawnmower or weed eater. More active use leads to better function, while sedentary periods worsen discomfort. He can still perform daily activities. Shoulder replacement was recommended 8 years ago and again in 2019, but he prefers to maintain his natural shoulder. He also has a history of neck and back issues from his previous  occupation, experiencing grinding and popping in his neck.    CARDIOVASCULAR:  He reports white coat syndrome, with blood pressure elevating in the physician's presence. Blood pressure was normal when checked by another staff member after brief exercise. He experiences feet swelling after prolonged sitting, such as when riding a zero-turn mower or driving for extended periods. He denies chest pressure, discomfort, or shortness of breath during physical activities.    GASTROINTESTINAL:  He has a history of duodenal ulcer diagnosed in 2017 via endoscopy, a few months after his mother's passing. He experienced post-prandial discomfort prior to diagnosis. H. pylori test was negative. A colonoscopy at that time detected no polyps. He previously experienced acid reflux symptoms manifesting as tongue irritation and cracking in response to certain foods and beverages.    MEDICATIONS:  He takes Protonix daily for GERD, Atorvastatin 10mg for cholesterol, Naproxen in the morning for musculoskeletal issues (prescribed twice daily), Tylenol at night, Trazodone, and Chlorazepate as prescribed. He also takes medication for tremor management.    FAMILY HISTORY:  He reports a brother-in-law and sister-in-law with Lazcano's esophagus.    SOCIAL HISTORY:  He recently celebrated his 50th wedding anniversary. He maintains an  active lifestyle, engaging in activities such as mowing grass, performing  work, and doing carpentry, but acknowledges he is not participating in dedicated exercise routines.    PREVENTIVE CARE:  He has not received this year's flu vaccination yet but expresses interest in the high-dose vaccine for seniors, as he is over 65 years old.    NEUROLOGICAL:  He reports being somewhat shaky due to his tremor, but states it has not worsened.      ROS:  General: no fever, no chills, no fatigue, no weight gain, no weight loss  Eyes: no vision changes, no redness, no discharge  ENT: no ear pain, no nasal congestion, no sore throat  Cardiovascular: no chest pain, no palpitations, no lower extremity edema, no chest pressure  Respiratory: no cough, no shortness of breath  Gastrointestinal: no abdominal pain, no nausea, no vomiting, no diarrhea, no constipation, no blood in stool  Genitourinary: no dysuria, no hematuria, no frequency  Musculoskeletal: reports joint pain, no muscle pain  Skin: no rash, no lesion  Neurological: no headache, no dizziness, no numbness, no tingling, reports tremors  Psychiatric: no anxiety, no depression, no sleep difficulty               3/24/2023   PHQ-9 Depression Patient Health Questionnaire   Over the last two weeks how often have you been bothered by little interest or pleasure in doing things 0   Over the last two weeks how often have you been bothered by feeling down, depressed or hopeless 0         9/10/2021    11:00 AM   GAD7   1. Feeling nervous, anxious, or on edge? 1   2. Not being able to stop or control worrying? 1   3. Worrying too much about different things? 1   4. Trouble relaxing? 1   5. Being so restless that it is hard to sit still? 0   6. Becoming easily annoyed or irritable? 0   7. Feeling afraid as if something awful might happen? 0   8. If you checked off any problems, how difficult have these problems made it for you to do your work, take care of things at home, or  get along with other people? 0   HANY-7 Score 4       Social History     Socioeconomic History    Marital status:    Tobacco Use    Smoking status: Never    Smokeless tobacco: Never   Substance and Sexual Activity    Alcohol use: No    Drug use: No    Sexual activity: Yes     Social Determinants of Health     Financial Resource Strain: Low Risk  (3/7/2024)    Overall Financial Resource Strain (CARDIA)     Difficulty of Paying Living Expenses: Not hard at all   Food Insecurity: No Food Insecurity (3/7/2024)    Hunger Vital Sign     Worried About Running Out of Food in the Last Year: Never true     Ran Out of Food in the Last Year: Never true   Transportation Needs: No Transportation Needs (3/7/2024)    PRAPARE - Transportation     Lack of Transportation (Medical): No     Lack of Transportation (Non-Medical): No   Physical Activity: Insufficiently Active (3/7/2024)    Exercise Vital Sign     Days of Exercise per Week: 2 days     Minutes of Exercise per Session: 30 min   Stress: No Stress Concern Present (3/7/2024)    [employer] - Occupational Stress Questionnaire     Feeling of Stress : Only a little   Housing Stability: Low Risk  (3/7/2024)    Housing Stability Vital Sign     Unable to Pay for Housing in the Last Year: No     Number of Places Lived in the Last Year: 1     Unstable Housing in the Last Year: No         Objective:       Vital Signs  /78   Pulse 74   Temp 97.4 °F (36.3 °C)   Wt 82.2 kg (181 lb 3.5 oz)   SpO2 98%   BMI 27.55 kg/m²     Physical Exam    General: Well-developed. Well-nourished. No acute distress.  Eyes: EOMI. Sclerae anicteric.  HENT: Normocephalic. Atraumatic. Nares patent. Moist oral mucosa.  Cardiovascular: Regular rate. Regular rhythm. No murmurs. No rubs. No gallops. Normal S1, S2.  Respiratory: Normal respiratory effort. Clear to auscultation bilaterally. No rales. No rhonchi. No wheezing.  Musculoskeletal: No  obvious  deformity.  Extremities: No lower extremity edema.  Neurological: Alert & oriented x3. No slurred speech. Normal gait.  Psychiatric: Normal mood. Normal affect. Good insight. Good judgment.  Skin: Warm. Dry. No rash.           Assessment:   69 y.o. male here for primary care visit       Plan:   1. Hypertension, unspecified type  Overview:  Current regimen:  -amlodipine 10 mg  -olmesartan 40 mg  Hx of white coat HTN    Assessment & Plan:  BP at goal today  BMP  Lab Results   Component Value Date     03/07/2024    K 4.9 03/07/2024     03/07/2024    CO2 27 03/07/2024    BUN 14 03/07/2024    CREATININE 1.0 03/07/2024    CALCIUM 10.3 03/07/2024    ANIONGAP 8 03/07/2024    EGFRNORACEVR >60.0 03/07/2024   **recheck BMP      Orders:  -     BASIC METABOLIC PANEL; Future; Expected date: 09/24/2024    2. Hyperlipidemia, unspecified hyperlipidemia type  Overview:  Current regimen:  -Lipitor 10 mg daily    Assessment & Plan:  Lab Results   Component Value Date    LDLCALC 53.0 (L) 10/20/2023     **due to recheck LDL    Orders:  -     LIPID PANEL; Future; Expected date: 09/24/2024    3. Chronic insomnia  Overview:  History of chronic benzodiazepine and flexeril use/dependence  Although they are both high risk medications in older adults, the patient is adamantly opposed to stopping either one  He is aware of and accepting of the risks  He also has not asked for a dosage increase in a long time  -he takes tranxene 3.75 mg twice daily and flexeril 10 mg nightly    Assessment & Plan:  Has never asked for early refills of either his Tranxene or his Flexeril  **refilled with 6-month supply in 8/2024      4. Anxiety  Overview:  Has been on Tranxene 3.75 mg bid prn anxiety chronically for many years  Patient and provider both aware that it is a BEERS list high-risk medication, but patient understands risks and wishes to continue Rx    Assessment & Plan:  **his 6-month refill of Tranxene was filled on schedule in  8/2024      5. Gastroesophageal reflux disease, unspecified whether esophagitis present  Assessment & Plan:  He takes Protonix daily  --he reports a history of a duodenal ulcer (on EGD by Dr. Loco in 2017)  --he was told at that time that he should remain on the PPI indefinitely  --his colonoscopy was done at that time as well >> no polyps  **he is on chronic NSAID therapy with naproxen, and so is on chronic PPI for gastroprotection (takes naproxen in the AM and Tylenol in the PM)  **ok to continue, but will continue to monitor for any nephrotoxicity      6. Primary osteoarthritis of both hips  Overview:  S/P bilateral total hip arthroplasty  On NSAID chronically with gastroprotection from PPI  Also on chronic flexeril as muscle relaxant    Assessment & Plan:  Biggest issue currently is his right shoulder  --has been advised to undergo shoulder replacement in the past, but is still able to do all of his work around the house and is not ready to proceed with surgery at this time      7. History of peptic ulcer disease  Assessment & Plan:  Discovered endoscopically in 2017--reportedly biopsies negative for H. Pylori and malignancy  --on chronic NSAID for many years  --on PPI to help protect against recurrence  --will keep an eye on renal function due to long term PPI use though      8. Intention tremor  Overview:  On Inderal LA 60 mg    Assessment & Plan:  Current dose of propanolol continues to work well for him      9. Healthcare maintenance  Assessment & Plan:  Influenza vaccine  --also due for RSV vaccine          Follow up in about 6 months (around 3/19/2025).    Blaise Cotto MD/[facility] [place]  Internal Medicine  [facility]

## 2024-09-17 NOTE — ASSESSMENT & PLAN NOTE
BP at goal today  BMP  Lab Results   Component Value Date     03/07/2024    K 4.9 03/07/2024     03/07/2024    CO2 27 03/07/2024    BUN 14 03/07/2024    CREATININE 1.0 03/07/2024    CALCIUM 10.3 03/07/2024    ANIONGAP 8 03/07/2024    EGFRNORACEVR >60.0 03/07/2024   **recheck BMP

## 2024-09-17 NOTE — ASSESSMENT & PLAN NOTE
Has never asked for early refills of either his Tranxene or his Flexeril  **refilled with 6-month supply in 8/2024

## 2024-09-19 ENCOUNTER — OFFICE VISIT (OUTPATIENT)
Dept: PRIMARY CARE CLINIC | Facility: CLINIC | Age: 69
End: 2024-09-19
Payer: MEDICARE

## 2024-09-19 VITALS
SYSTOLIC BLOOD PRESSURE: 128 MMHG | WEIGHT: 181.19 LBS | OXYGEN SATURATION: 98 % | TEMPERATURE: 97 F | HEART RATE: 74 BPM | BODY MASS INDEX: 27.55 KG/M2 | DIASTOLIC BLOOD PRESSURE: 78 MMHG

## 2024-09-19 DIAGNOSIS — F51.04 CHRONIC INSOMNIA: ICD-10-CM

## 2024-09-19 DIAGNOSIS — Z87.11 HISTORY OF PEPTIC ULCER DISEASE: ICD-10-CM

## 2024-09-19 DIAGNOSIS — I10 HYPERTENSION, UNSPECIFIED TYPE: Primary | ICD-10-CM

## 2024-09-19 DIAGNOSIS — F41.9 ANXIETY: ICD-10-CM

## 2024-09-19 DIAGNOSIS — Z00.00 HEALTHCARE MAINTENANCE: ICD-10-CM

## 2024-09-19 DIAGNOSIS — E78.5 HYPERLIPIDEMIA, UNSPECIFIED HYPERLIPIDEMIA TYPE: ICD-10-CM

## 2024-09-19 DIAGNOSIS — M16.0 PRIMARY OSTEOARTHRITIS OF BOTH HIPS: ICD-10-CM

## 2024-09-19 DIAGNOSIS — G25.2 INTENTION TREMOR: ICD-10-CM

## 2024-09-19 DIAGNOSIS — K21.9 GASTROESOPHAGEAL REFLUX DISEASE, UNSPECIFIED WHETHER ESOPHAGITIS PRESENT: ICD-10-CM

## 2024-09-19 PROCEDURE — 80048 BASIC METABOLIC PNL TOTAL CA: CPT | Performed by: INTERNAL MEDICINE

## 2024-09-19 PROCEDURE — 99999 PR PBB SHADOW E&M-EST. PATIENT-LVL III: CPT | Mod: PBBFAC,,, | Performed by: INTERNAL MEDICINE

## 2024-09-19 PROCEDURE — 80061 LIPID PANEL: CPT | Performed by: INTERNAL MEDICINE

## 2024-09-19 PROCEDURE — 99213 OFFICE O/P EST LOW 20 MIN: CPT | Mod: PBBFAC,PN | Performed by: INTERNAL MEDICINE

## 2024-09-19 NOTE — ASSESSMENT & PLAN NOTE
Biggest issue currently is his right shoulder  --has been advised to undergo shoulder replacement in the past, but is still able to do all of his work around the house and is not ready to proceed with surgery at this time

## 2024-09-19 NOTE — ASSESSMENT & PLAN NOTE
He takes Protonix daily  --he reports a history of a duodenal ulcer (on EGD by Dr. Loco in 2017)  --he was told at that time that he should remain on the PPI indefinitely  --his colonoscopy was done at that time as well >> no polyps  **he is on chronic NSAID therapy with naproxen, and so is on chronic PPI for gastroprotection (takes naproxen in the AM and Tylenol in the PM)  **ok to continue, but will continue to monitor for any nephrotoxicity

## 2024-09-20 LAB
ANION GAP SERPL CALC-SCNC: 10 MMOL/L (ref 8–16)
BUN SERPL-MCNC: 24 MG/DL (ref 8–23)
CALCIUM SERPL-MCNC: 9.8 MG/DL (ref 8.7–10.5)
CHLORIDE SERPL-SCNC: 109 MMOL/L (ref 95–110)
CHOLEST SERPL-MCNC: 122 MG/DL (ref 120–199)
CHOLEST/HDLC SERPL: 2.8 {RATIO} (ref 2–5)
CO2 SERPL-SCNC: 21 MMOL/L (ref 23–29)
CREAT SERPL-MCNC: 1.1 MG/DL (ref 0.5–1.4)
EST. GFR  (NO RACE VARIABLE): >60 ML/MIN/1.73 M^2
GLUCOSE SERPL-MCNC: 93 MG/DL (ref 70–110)
HDLC SERPL-MCNC: 43 MG/DL (ref 40–75)
HDLC SERPL: 35.2 % (ref 20–50)
LDLC SERPL CALC-MCNC: 63 MG/DL (ref 63–159)
NONHDLC SERPL-MCNC: 79 MG/DL
POTASSIUM SERPL-SCNC: 3.9 MMOL/L (ref 3.5–5.1)
SODIUM SERPL-SCNC: 140 MMOL/L (ref 136–145)
TRIGL SERPL-MCNC: 80 MG/DL (ref 30–150)

## 2024-12-03 DIAGNOSIS — G25.2 INTENTION TREMOR: Primary | ICD-10-CM

## 2024-12-03 RX ORDER — PROPRANOLOL HYDROCHLORIDE 20 MG/1
20 TABLET ORAL 2 TIMES DAILY
Qty: 60 TABLET | Refills: 1 | Status: SHIPPED | OUTPATIENT
Start: 2024-12-03 | End: 2025-12-03

## 2024-12-03 NOTE — ASSESSMENT & PLAN NOTE
He messaged me that his HR has been dropping down into the 40s at times, combined with feeling a lack of energy/getting started in the mornings  --he asked that even though it has helped with his tremor if he could start tapering off the beta-blocker  **stop inderal LA 60 mg  **change to propanolol 20 mg bid for the next 2 weeks, then decrease to 20 mg daily for 2 weeks, then stop

## 2024-12-26 RX ORDER — CYCLOBENZAPRINE HCL 10 MG
10 TABLET ORAL NIGHTLY
Qty: 90 TABLET | Refills: 3 | Status: SHIPPED | OUTPATIENT
Start: 2024-12-26

## 2025-01-08 RX ORDER — FLUCONAZOLE 100 MG/1
100 TABLET ORAL DAILY
Qty: 14 TABLET | Refills: 0 | Status: SHIPPED | OUTPATIENT
Start: 2025-01-08 | End: 2025-01-22

## 2025-02-15 DIAGNOSIS — F41.9 ANXIETY: ICD-10-CM

## 2025-02-17 RX ORDER — CLORAZEPATE DIPOTASSIUM 3.75 MG/1
3.75 TABLET ORAL 2 TIMES DAILY PRN
Qty: 60 TABLET | Refills: 5 | Status: SHIPPED | OUTPATIENT
Start: 2025-02-17 | End: 2025-08-16

## 2025-02-22 DIAGNOSIS — Z00.00 ENCOUNTER FOR MEDICARE ANNUAL WELLNESS EXAM: ICD-10-CM

## 2025-03-17 DIAGNOSIS — Z91.89 AT INCREASED RISK FOR CARDIOVASCULAR DISEASE: ICD-10-CM

## 2025-03-17 DIAGNOSIS — I10 HYPERTENSION, UNSPECIFIED TYPE: ICD-10-CM

## 2025-03-17 RX ORDER — ATORVASTATIN CALCIUM 10 MG/1
10 TABLET, FILM COATED ORAL
Qty: 90 TABLET | Refills: 3 | Status: SHIPPED | OUTPATIENT
Start: 2025-03-17

## 2025-03-17 RX ORDER — AMLODIPINE BESYLATE 10 MG/1
10 TABLET ORAL
Qty: 90 TABLET | Refills: 3 | Status: SHIPPED | OUTPATIENT
Start: 2025-03-17

## 2025-03-17 RX ORDER — OLMESARTAN MEDOXOMIL 40 MG/1
40 TABLET ORAL
Qty: 90 TABLET | Refills: 3 | Status: SHIPPED | OUTPATIENT
Start: 2025-03-17

## 2025-04-01 RX ORDER — PANTOPRAZOLE SODIUM 40 MG/1
40 TABLET, DELAYED RELEASE ORAL
Qty: 90 TABLET | Refills: 3 | Status: SHIPPED | OUTPATIENT
Start: 2025-04-01

## 2025-04-14 ENCOUNTER — TELEPHONE (OUTPATIENT)
Dept: PRIMARY CARE CLINIC | Facility: CLINIC | Age: 70
End: 2025-04-14
Payer: COMMERCIAL

## 2025-04-14 NOTE — TELEPHONE ENCOUNTER
----- Message from Sukhdeep sent at 4/14/2025  9:33 AM CDT -----  Contact: [phone]  Patient states he is checking the status of appt needs on 4/17/25 per your last text. Please call or text [phone].

## 2025-04-16 NOTE — ASSESSMENT & PLAN NOTE
"UACR No results found for: "CRTURNMGSPEC", "UMICROALBABS", "MICALBCREAT"   BMP BMP  Lab Results   Component Value Date     09/19/2024    K 3.9 09/19/2024     09/19/2024    CO2 21 (L) 09/19/2024    BUN 24 (H) 09/19/2024    CREATININE 1.1 09/19/2024    CALCIUM 9.8 09/19/2024    ANIONGAP 10 09/19/2024    EGFRNORACEVR >60.0 09/19/2024      **due for BMP and check UACR  "

## 2025-04-16 NOTE — ASSESSMENT & PLAN NOTE
LDL Cholesterol (mg/dL)   Date Value   09/19/2024 63.0   --on statin for primary prevention (RF for CAD include age, HTN, HLD)

## 2025-04-16 NOTE — PROGRESS NOTES
"[facility] Appointment      Subjective:      Patient ID: Tom Shaw is a 69 y.o. male.    Chief Complaint: Follow-up (No issues or concerns.)      HPI:    History of Present Illness    CHIEF COMPLAINT:  Tom presents today for follow-up.    ACTIVE INFECTION - MRSA OF KNEE:  He developed a knee infection while changing spark plugs in his truck on March 28, requiring kneeling on the engine. Initial treatment at a walk-in clinic included rocephin injection and clindamycin. Condition worsened after lawn mowing activities. Subsequent evaluation by orthopedic surgeon Dr. Chi confirmed MRSA via culture following incision and drainage. And he is currently on day 6 of a 10-day course of clindamycin. He reports improvement in infection and swelling.    MUSCULOSKELETAL:  He reports his shoulder condition is "stable". He experiences muscle spasms in the neck, shoulder, and back for which he takes naprosyn and flexeril chronically.     MEDICATIONS:  Currently taking naproxen twice daily and Flexeril nightly for pain management. Recently completed prednisone 40mg course. Discontinued propranolol due to concerns about low heart rate (46) and suspected side effects.    TREMOR:  Tremor has returned since discontinuing propranolol, occasionally affecting handwriting but not significantly impacting daily function.    PAST MEDICAL HISTORY:  He had a severe GI illness in December requiring emergency room visit, initially suspected to be norovirus but diagnosis uncertain as close contacts remained unaffected. Episode has completely resolved.    WEIGHT MANAGEMENT:  He reports recent weight gain of a few lbs, attributing it to less dietary restriction. Continues to consume sugar-free drinks.      ROS:  General: no fever, no chills, no fatigue, reports weight gain, no weight loss  Eyes: no vision changes, no redness, no discharge  ENT: no ear pain, no nasal congestion, no sore throat  Cardiovascular: no chest pain, no " "palpitations, no lower extremity edema  Respiratory: no cough, no shortness of breath  Gastrointestinal: no abdominal pain, no nausea, no vomiting, no diarrhea, no constipation, no blood in stool  Genitourinary: no dysuria, no hematuria, no frequency  Musculoskeletal: no joint pain, no muscle pain, reports muscle spasms  Skin: no rash, reports lesion, reports sores  Neurological: no headache, no dizziness, no numbness, no tingling, reports tremors  Psychiatric: no anxiety, no depression, no sleep difficulty             3/24/2023 2/25/2022 9/10/2021   PHQ-9 Depression Patient Health Questionnaire   Over the last two weeks how often have you been bothered by little interest or pleasure in doing things 0  0  0     0    Over the last two weeks how often have you been bothered by feeling down, depressed or hopeless 0 0 0     0        Data saved with a previous flowsheet row definition    Multiple values from one day are sorted in reverse-chronological order          9/10/2021    11:00 AM   GAD7   1. Feeling nervous, anxious, or on edge? 1   2. Not being able to stop or control worrying? 1   3. Worrying too much about different things? 1   4. Trouble relaxing? 1   5. Being so restless that it is hard to sit still? 0   6. Becoming easily annoyed or irritable? 0   7. Feeling afraid as if something awful might happen? 0   8. If you checked off any problems, how difficult have these problems made it for you to do your work, take care of things at home, or get along with other people? 0   HANY-7 Score 4         Objective:     PHYSICAL EXAM   Vital Signs  /70 (BP Location: Right arm, Patient Position: Sitting)   Pulse 82   Temp 97.7 °F (36.5 °C) (Temporal)   Ht 5' 8" (1.727 m)   Wt 82.8 kg (182 lb 8.7 oz)   SpO2 98%   BMI 27.76 kg/m²     Physical Exam    Vitals: Blood pressure: 136/70.  General: Well-developed. Well-nourished. No acute distress.  Eyes: EOMI. Sclerae anicteric.  HENT: Normocephalic. Atraumatic. Nares " "patent. Moist oral mucosa.  Cardiovascular: Regular rate. Regular rhythm. No murmurs. No rubs. No gallops. Normal S1, S2.  Respiratory: Normal respiratory effort. Clear to auscultation bilaterally. No rales. No rhonchi. No wheezing.  Musculoskeletal: No  obvious deformity.  Extremities: No lower extremity edema.  Neurological: Alert & oriented x3. No slurred speech. Normal gait.  Psychiatric: Normal mood. Normal affect. Good insight. Good judgment.  Skin: Warm. Dry. No rash. Healing area on skin of pre-patellar bursa, S/P recent I&D with cellulitis, erythema has faded and there is some superficial desquamation      Assessment:   69 y.o. male here for primary care visit       Plan:   1. Hypertension, unspecified type  Overview:  Current regimen:  -amlodipine 10 mg  -olmesartan 40 mg  Hx of white coat HTN    Assessment & Plan:  UACR No results found for: "CRTURNMGSPEC", "UMICROALBABS", "MICALBCREAT"   BMP BMP  Lab Results   Component Value Date     09/19/2024    K 3.9 09/19/2024     09/19/2024    CO2 21 (L) 09/19/2024    BUN 24 (H) 09/19/2024    CREATININE 1.1 09/19/2024    CALCIUM 9.8 09/19/2024    ANIONGAP 10 09/19/2024    EGFRNORACEVR >60.0 09/19/2024      **due for BMP and check UACR    Orders:  -     Microalbumin/Creatinine Ratio, Urine; Future  -     Basic Metabolic Panel; Future; Expected date: 04/17/2025    2. Hyperlipidemia, unspecified hyperlipidemia type  Overview:  Current regimen:  -Lipitor 10 mg daily    Assessment & Plan:  LDL Cholesterol (mg/dL)   Date Value   09/19/2024 63.0   --on statin for primary prevention (RF for CAD include age, HTN, HLD)      3. Anxiety  Overview:  Has been on Tranxene 3.75 mg bid prn anxiety chronically for many years  Patient and provider both aware that it is a BEERS list high-risk medication, but patient understands risks and wishes to continue Rx    Assessment & Plan:  --his 6-month supply of tranxene was filled 2/2025  --due for next refill 8/2025      4. " Primary osteoarthritis of both hips  Overview:  S/P bilateral total hip arthroplasty  On NSAID chronically with gastroprotection from PPI  Also on chronic flexeril as muscle relaxant    Assessment & Plan:  --Right shoulder doing better  --his current regimen of NSAID + PPI is working well for him  (eGFR has been steadily > 60 with no current evidence of nephrotoxicity or CKD)    Orders:  -     naproxen (NAPROSYN) 500 MG tablet; Take 1 tablet (500 mg total) by mouth 2 (two) times daily with meals.  Dispense: 180 tablet; Refill: 3    5. Intention tremor  Overview:  Off propanolol because his HR dropped down into the 40s    Assessment & Plan:  His tremor has returned since tapering off the propanolol  --his HR decreased into 40s - 50s; he didn't really have any significant symptoms but the low HR scared him and so he chose to taper off of it  --he is able to work through the tremor without too much difficulty other than it can impair his handwriting    Follow up in about 6 months (around 10/17/2025).    Blaise Cotto MD/[facility] [place]  Internal Medicine  [facility]

## 2025-04-17 ENCOUNTER — OFFICE VISIT (OUTPATIENT)
Dept: PRIMARY CARE CLINIC | Facility: CLINIC | Age: 70
End: 2025-04-17
Payer: MEDICARE

## 2025-04-17 VITALS
OXYGEN SATURATION: 98 % | WEIGHT: 182.56 LBS | BODY MASS INDEX: 27.67 KG/M2 | HEIGHT: 68 IN | HEART RATE: 82 BPM | SYSTOLIC BLOOD PRESSURE: 136 MMHG | DIASTOLIC BLOOD PRESSURE: 70 MMHG | TEMPERATURE: 98 F

## 2025-04-17 DIAGNOSIS — G25.2 INTENTION TREMOR: ICD-10-CM

## 2025-04-17 DIAGNOSIS — I10 HYPERTENSION, UNSPECIFIED TYPE: Primary | ICD-10-CM

## 2025-04-17 DIAGNOSIS — E78.5 HYPERLIPIDEMIA, UNSPECIFIED HYPERLIPIDEMIA TYPE: ICD-10-CM

## 2025-04-17 DIAGNOSIS — F41.9 ANXIETY: ICD-10-CM

## 2025-04-17 DIAGNOSIS — M16.0 PRIMARY OSTEOARTHRITIS OF BOTH HIPS: ICD-10-CM

## 2025-04-17 PROCEDURE — 99214 OFFICE O/P EST MOD 30 MIN: CPT | Mod: S$PBB,,, | Performed by: INTERNAL MEDICINE

## 2025-04-17 PROCEDURE — 99214 OFFICE O/P EST MOD 30 MIN: CPT | Mod: PBBFAC,PN | Performed by: INTERNAL MEDICINE

## 2025-04-17 PROCEDURE — 99999 PR PBB SHADOW E&M-EST. PATIENT-LVL IV: CPT | Mod: PBBFAC,,, | Performed by: INTERNAL MEDICINE

## 2025-04-17 PROCEDURE — 80048 BASIC METABOLIC PNL TOTAL CA: CPT | Performed by: INTERNAL MEDICINE

## 2025-04-17 PROCEDURE — 82043 UR ALBUMIN QUANTITATIVE: CPT | Performed by: INTERNAL MEDICINE

## 2025-04-17 RX ORDER — CLINDAMYCIN HYDROCHLORIDE 300 MG/1
CAPSULE ORAL
COMMUNITY
Start: 2025-04-08 | End: 2025-04-17

## 2025-04-17 RX ORDER — CLINDAMYCIN HYDROCHLORIDE 150 MG/1
1 CAPSULE ORAL 3 TIMES DAILY
COMMUNITY
End: 2025-04-17

## 2025-04-17 RX ORDER — NAPROXEN 500 MG/1
500 TABLET ORAL 2 TIMES DAILY WITH MEALS
Qty: 180 TABLET | Refills: 3 | Status: SHIPPED | OUTPATIENT
Start: 2025-04-17

## 2025-04-17 NOTE — ASSESSMENT & PLAN NOTE
--Right shoulder doing better  --his current regimen of NSAID + PPI is working well for him  (eGFR has been steadily > 60 with no current evidence of nephrotoxicity or CKD)

## 2025-04-17 NOTE — ASSESSMENT & PLAN NOTE
His tremor has returned since tapering off the propanolol  --his HR decreased into 40s - 50s; he didn't really have any significant symptoms but the low HR scared him and so he chose to taper off of it  --he is able to work through the tremor without too much difficulty other than it can impair his handwriting

## 2025-04-18 LAB
ALBUMIN/CREAT UR: NORMAL
ANION GAP (OHS): 7 MMOL/L (ref 8–16)
BUN SERPL-MCNC: 14 MG/DL (ref 8–23)
CALCIUM SERPL-MCNC: 9.6 MG/DL (ref 8.7–10.5)
CHLORIDE SERPL-SCNC: 105 MMOL/L (ref 95–110)
CO2 SERPL-SCNC: 27 MMOL/L (ref 23–29)
CREAT SERPL-MCNC: 0.9 MG/DL (ref 0.5–1.4)
CREAT UR-MCNC: 97 MG/DL (ref 23–375)
GFR SERPLBLD CREATININE-BSD FMLA CKD-EPI: >60 ML/MIN/1.73/M2
GLUCOSE SERPL-MCNC: 93 MG/DL (ref 70–110)
MICROALBUMIN UR-MCNC: <5 UG/ML (ref ?–5000)
POTASSIUM SERPL-SCNC: 3.9 MMOL/L (ref 3.5–5.1)
SODIUM SERPL-SCNC: 139 MMOL/L (ref 136–145)

## 2025-04-21 ENCOUNTER — RESULTS FOLLOW-UP (OUTPATIENT)
Dept: PRIMARY CARE CLINIC | Facility: CLINIC | Age: 70
End: 2025-04-21

## 2025-08-16 DIAGNOSIS — F41.9 ANXIETY: ICD-10-CM

## 2025-08-16 RX ORDER — CLORAZEPATE DIPOTASSIUM 3.75 MG/1
3.75 TABLET ORAL 2 TIMES DAILY PRN
Qty: 60 TABLET | Refills: 5 | Status: SHIPPED | OUTPATIENT
Start: 2025-08-16 | End: 2026-02-12